# Patient Record
Sex: MALE | Race: BLACK OR AFRICAN AMERICAN | NOT HISPANIC OR LATINO | Employment: OTHER | URBAN - NONMETROPOLITAN AREA
[De-identification: names, ages, dates, MRNs, and addresses within clinical notes are randomized per-mention and may not be internally consistent; named-entity substitution may affect disease eponyms.]

---

## 2017-04-13 RX ORDER — AMLODIPINE BESYLATE 5 MG/1
TABLET ORAL
Qty: 90 TABLET | Refills: 2 | Status: SHIPPED | OUTPATIENT
Start: 2017-04-13 | End: 2018-01-12 | Stop reason: SDUPTHER

## 2017-04-13 RX ORDER — PRAVASTATIN SODIUM 80 MG/1
TABLET ORAL
Qty: 90 TABLET | Refills: 2 | Status: SHIPPED | OUTPATIENT
Start: 2017-04-13 | End: 2018-01-26 | Stop reason: SDUPTHER

## 2017-04-13 RX ORDER — LISINOPRIL AND HYDROCHLOROTHIAZIDE 25; 20 MG/1; MG/1
TABLET ORAL
Qty: 90 TABLET | Refills: 2 | Status: SHIPPED | OUTPATIENT
Start: 2017-04-13 | End: 2018-01-12 | Stop reason: SDUPTHER

## 2017-04-21 ENCOUNTER — LAB (OUTPATIENT)
Dept: LAB | Facility: OTHER | Age: 62
End: 2017-04-21

## 2017-04-21 DIAGNOSIS — I10 ESSENTIAL HYPERTENSION: ICD-10-CM

## 2017-04-21 DIAGNOSIS — E78.5 HYPERLIPIDEMIA, UNSPECIFIED HYPERLIPIDEMIA TYPE: ICD-10-CM

## 2017-04-21 DIAGNOSIS — E11.9 TYPE 2 DIABETES MELLITUS WITHOUT COMPLICATION, WITHOUT LONG-TERM CURRENT USE OF INSULIN (HCC): ICD-10-CM

## 2017-04-21 LAB
ALBUMIN SERPL-MCNC: 4.4 G/DL (ref 3.2–5.5)
ALBUMIN/GLOB SERPL: 1.3 G/DL (ref 1–3)
ALP SERPL-CCNC: 69 U/L (ref 15–121)
ALT SERPL W P-5'-P-CCNC: 56 U/L (ref 10–60)
ANION GAP SERPL CALCULATED.3IONS-SCNC: 9 MMOL/L (ref 5–15)
AST SERPL-CCNC: 45 U/L (ref 10–60)
BILIRUB SERPL-MCNC: 1.1 MG/DL (ref 0.2–1)
BUN BLD-MCNC: 15 MG/DL (ref 8–25)
BUN/CREAT SERPL: 16.7 (ref 7–25)
CALCIUM SPEC-SCNC: 9.7 MG/DL (ref 8.4–10.8)
CHLORIDE SERPL-SCNC: 104 MMOL/L (ref 100–112)
CHOLEST SERPL-MCNC: 148 MG/DL (ref 150–200)
CO2 SERPL-SCNC: 27 MMOL/L (ref 20–32)
CREAT BLD-MCNC: 0.9 MG/DL (ref 0.4–1.3)
DEPRECATED RDW RBC AUTO: 39.3 FL (ref 35.1–43.9)
ERYTHROCYTE [DISTWIDTH] IN BLOOD BY AUTOMATED COUNT: 16.5 % (ref 11.5–14.5)
GFR SERPL CREATININE-BSD FRML MDRD: 103 ML/MIN/1.73 (ref 49–113)
GLOBULIN UR ELPH-MCNC: 3.5 GM/DL (ref 2.5–4.6)
GLUCOSE BLD-MCNC: 120 MG/DL (ref 70–100)
HBA1C MFR BLD: 6.67 % (ref 4–5.6)
HCT VFR BLD AUTO: 38.4 % (ref 39–49)
HDLC SERPL-MCNC: 34 MG/DL (ref 35–100)
HGB BLD-MCNC: 12.8 G/DL (ref 13.7–17.3)
LARGE PLATELETS: NORMAL
LDLC SERPL CALC-MCNC: 96 MG/DL
LDLC/HDLC SERPL: 2.81 {RATIO}
LYMPHOCYTES # BLD MANUAL: 3.57 10*3/MM3 (ref 0.6–4.2)
LYMPHOCYTES NFR BLD MANUAL: 46 % (ref 10–50)
LYMPHOCYTES NFR BLD MANUAL: 8 % (ref 0–12)
MCH RBC QN AUTO: 22.3 PG (ref 26.5–34)
MCHC RBC AUTO-ENTMCNC: 33.3 G/DL (ref 31.5–36.3)
MCV RBC AUTO: 66.9 FL (ref 80–98)
MICROCYTES BLD QL: NORMAL
MONOCYTES # BLD AUTO: 0.62 10*3/MM3 (ref 0–0.9)
NEUTROPHILS # BLD AUTO: 3.57 10*3/MM3 (ref 2–8.6)
NEUTROPHILS NFR BLD MANUAL: 46 % (ref 37–80)
PLATELET # BLD AUTO: 124 10*3/MM3 (ref 150–450)
PMV BLD AUTO: 0 FL (ref 8–12)
POTASSIUM BLD-SCNC: 3.7 MMOL/L (ref 3.4–5.4)
PROT SERPL-MCNC: 7.9 G/DL (ref 6.7–8.2)
PSA SERPL-MCNC: 0.68 NG/ML (ref 0–4)
RBC # BLD AUTO: 5.74 10*6/MM3 (ref 4.37–5.74)
SCAN SLIDE: NORMAL
SMALL PLATELETS BLD QL SMEAR: NORMAL
SODIUM BLD-SCNC: 140 MMOL/L (ref 134–146)
TARGETS BLD QL SMEAR: NORMAL
TRIGL SERPL-MCNC: 92 MG/DL (ref 35–160)
TSH SERPL DL<=0.05 MIU/L-ACNC: 1.45 MIU/ML (ref 0.46–4.68)
VLDLC SERPL-MCNC: 18.4 MG/DL
WBC MORPH BLD: NORMAL
WBC NRBC COR # BLD: 7.77 10*3/MM3 (ref 3.2–9.8)

## 2017-04-21 PROCEDURE — G0103 PSA SCREENING: HCPCS | Performed by: FAMILY MEDICINE

## 2017-04-21 PROCEDURE — 36415 COLL VENOUS BLD VENIPUNCTURE: CPT | Performed by: FAMILY MEDICINE

## 2017-04-21 PROCEDURE — 80053 COMPREHEN METABOLIC PANEL: CPT | Performed by: FAMILY MEDICINE

## 2017-04-21 PROCEDURE — 85025 COMPLETE CBC W/AUTO DIFF WBC: CPT | Performed by: FAMILY MEDICINE

## 2017-04-21 PROCEDURE — 83036 HEMOGLOBIN GLYCOSYLATED A1C: CPT | Performed by: FAMILY MEDICINE

## 2017-04-21 PROCEDURE — 80061 LIPID PANEL: CPT | Performed by: FAMILY MEDICINE

## 2017-04-21 PROCEDURE — 84443 ASSAY THYROID STIM HORMONE: CPT | Performed by: FAMILY MEDICINE

## 2017-04-28 ENCOUNTER — OFFICE VISIT (OUTPATIENT)
Dept: FAMILY MEDICINE CLINIC | Facility: CLINIC | Age: 62
End: 2017-04-28

## 2017-04-28 VITALS
BODY MASS INDEX: 31.58 KG/M2 | TEMPERATURE: 96.6 F | SYSTOLIC BLOOD PRESSURE: 132 MMHG | HEIGHT: 71 IN | WEIGHT: 225.6 LBS | HEART RATE: 64 BPM | OXYGEN SATURATION: 96 % | DIASTOLIC BLOOD PRESSURE: 90 MMHG

## 2017-04-28 DIAGNOSIS — I10 ESSENTIAL HYPERTENSION: Primary | ICD-10-CM

## 2017-04-28 DIAGNOSIS — B18.2 CHRONIC HEPATITIS C WITHOUT HEPATIC COMA (HCC): ICD-10-CM

## 2017-04-28 DIAGNOSIS — E78.01 FAMILIAL HYPERCHOLESTEROLEMIA: ICD-10-CM

## 2017-04-28 DIAGNOSIS — E11.9 TYPE 2 DIABETES MELLITUS WITHOUT COMPLICATION, WITHOUT LONG-TERM CURRENT USE OF INSULIN (HCC): ICD-10-CM

## 2017-04-28 DIAGNOSIS — D50.9 IRON DEFICIENCY ANEMIA, UNSPECIFIED IRON DEFICIENCY ANEMIA TYPE: ICD-10-CM

## 2017-04-28 PROCEDURE — 99214 OFFICE O/P EST MOD 30 MIN: CPT | Performed by: FAMILY MEDICINE

## 2017-04-28 NOTE — PROGRESS NOTES
Subjective   Tao Cuadra is a 62 y.o. male who presents to the office for follow-up and review of labs.     History of Present Illness   Patient with history of hypertension diabetes and hyperlipidemia is in today for reevaluation and review labs.  He is doing well and voices no acute complaints at this time.  He denies any chest pain PND orthopnea.  No neurological symptoms  The following portions of the patient's history were reviewed and updated as appropriate: allergies, current medications, past family history, past medical history, past social history, past surgical history and problem list.    Review of Systems   Constitutional: Positive for fatigue.   HENT: Negative.    Eyes: Negative.    Respiratory: Negative.    Cardiovascular: Negative.    Gastrointestinal: Negative.    Endocrine: Negative.    Genitourinary: Negative.    Musculoskeletal: Positive for arthralgias.   Skin: Negative.    Allergic/Immunologic: Negative.    Neurological: Negative.    Hematological: Negative.    Psychiatric/Behavioral: Negative.    All other systems reviewed and are negative.      Objective   Physical Exam   Constitutional: He is oriented to person, place, and time. He appears well-developed and well-nourished.   HENT:   Head: Normocephalic and atraumatic.   Right Ear: External ear normal.   Left Ear: External ear normal.   Nose: Nose normal.   Mouth/Throat: Oropharynx is clear and moist.   Eyes: Conjunctivae and EOM are normal. Pupils are equal, round, and reactive to light.   Neck: Normal range of motion. Neck supple.   Cardiovascular: Normal rate, regular rhythm, normal heart sounds and intact distal pulses.  Exam reveals no gallop and no friction rub.    No murmur heard.  Pulmonary/Chest: Effort normal and breath sounds normal. He has no wheezes. He has no rales.   Abdominal: Soft. Bowel sounds are normal. He exhibits no mass. There is no tenderness. There is no rebound and no guarding.   Musculoskeletal: Normal range of  motion.    Tao had a diabetic foot exam performed today.   During the foot exam he had a monofilament test performed.    Neurological Sensory Findings -  Unaltered sharp/dull right ankle/foot discrimination and unaltered sharp/dull left ankle/foot discrimination.    Vascular Status -  His exam exhibits right foot vasculature normal. His exam exhibits left foot vasculature normal.   Skin Integrity  -  His right foot skin is intact.     Tao 's left foot skin is intact. .  Neurological: He is alert and oriented to person, place, and time. He has normal reflexes. No cranial nerve deficit. He exhibits normal muscle tone.   Skin: Skin is warm and dry. No rash noted.   Psychiatric: He has a normal mood and affect. His behavior is normal. Judgment and thought content normal.   Nursing note and vitals reviewed.      Assessment/Plan   Tao was seen today for hypertension and diabetes.    Diagnoses and all orders for this visit:    Essential hypertension    Familial hypercholesterolemia    Type 2 diabetes mellitus without complication, without long-term current use of insulin  -     Comprehensive Metabolic Panel; Future  -     Hemoglobin A1c; Future  -     LDL Cholesterol, Direct; Future  -     MicroAlbumin, Urine, Random; Future    Iron deficiency anemia, unspecified iron deficiency anemia type    Chronic hepatitis C without hepatic coma    continue current therapies     Labs are reviewed with patient.    PHQ-9 Depression Screening 4/28/2017   Little interest or pleasure in doing things 1   Feeling down, depressed, or hopeless 0   Trouble falling or staying asleep, or sleeping too much 1   Feeling tired or having little energy 1   Poor appetite or overeating 0   Feeling bad about yourself - or that you are a failure or have let yourself or your family down 0   Trouble concentrating on things, such as reading the newspaper or watching television 0   Moving or speaking so slowly that other people could have noticed.  Or the opposite - being so fidgety or restless that you have been moving around a lot more than usual 0   Thoughts that you would be better off dead, or of hurting yourself in some way 0   PHQ-9 Total Score 3   If you checked off any problems, how difficult have these problems made it for you to do your work, take care of things at home, or get along with other people? Not difficult at all         Lab on 04/21/2017   Component Date Value Ref Range Status   • Glucose 04/21/2017 120* 70 - 100 mg/dL Final   • BUN 04/21/2017 15  8 - 25 mg/dL Final   • Creatinine 04/21/2017 0.90  0.40 - 1.30 mg/dL Final   • Sodium 04/21/2017 140  134 - 146 mmol/L Final   • Potassium 04/21/2017 3.7  3.4 - 5.4 mmol/L Final   • Chloride 04/21/2017 104  100 - 112 mmol/L Final   • CO2 04/21/2017 27.0  20.0 - 32.0 mmol/L Final   • Calcium 04/21/2017 9.7  8.4 - 10.8 mg/dL Final   • Total Protein 04/21/2017 7.9  6.7 - 8.2 g/dL Final   • Albumin 04/21/2017 4.40  3.20 - 5.50 g/dL Final   • ALT (SGPT) 04/21/2017 56  10 - 60 U/L Final   • AST (SGOT) 04/21/2017 45  10 - 60 U/L Final   • Alkaline Phosphatase 04/21/2017 69  15 - 121 U/L Final   • Total Bilirubin 04/21/2017 1.1* 0.2 - 1.0 mg/dL Final   • eGFR   Amer 04/21/2017 103  49 - 113 mL/min/1.73 Final   • Globulin 04/21/2017 3.5  2.5 - 4.6 gm/dL Final   • A/G Ratio 04/21/2017 1.3  1.0 - 3.0 g/dL Final   • BUN/Creatinine Ratio 04/21/2017 16.7  7.0 - 25.0 Final   • Anion Gap 04/21/2017 9.0  5.0 - 15.0 mmol/L Final   • Hemoglobin A1C 04/21/2017 6.67* 4 - 5.6 % Final   • Total Cholesterol 04/21/2017 148* 150 - 200 mg/dL Final   • Triglycerides 04/21/2017 92  35 - 160 mg/dL Final   • HDL Cholesterol 04/21/2017 34* 35 - 100 mg/dL Final   • LDL Cholesterol  04/21/2017 96  mg/dL Final   • VLDL Cholesterol 04/21/2017 18.4  mg/dL Final   • LDL/HDL Ratio 04/21/2017 2.81   Final   • TSH 04/21/2017 1.450  0.460 - 4.680 mIU/mL Final   • PSA 04/21/2017 0.679  0.000 - 4.000 ng/mL Final   • WBC 04/21/2017  7.77  3.20 - 9.80 10*3/mm3 Final   • RBC 04/21/2017 5.74  4.37 - 5.74 10*6/mm3 Final   • Hemoglobin 04/21/2017 12.8* 13.7 - 17.3 g/dL Final   • Hematocrit 04/21/2017 38.4* 39.0 - 49.0 % Final   • MCV 04/21/2017 66.9* 80.0 - 98.0 fL Final   • MCH 04/21/2017 22.3* 26.5 - 34.0 pg Final   • MCHC 04/21/2017 33.3  31.5 - 36.3 g/dL Final   • RDW 04/21/2017 16.5* 11.5 - 14.5 % Final   • RDW-SD 04/21/2017 39.3  35.1 - 43.9 fl Final   • MPV 04/21/2017 0.0* 8.0 - 12.0 fL Final   • Platelets 04/21/2017 124* 150 - 450 10*3/mm3 Final   • Scan Slide 04/21/2017    Final    See Manual Differential Results   • Neutrophil % 04/21/2017 46.0  37.0 - 80.0 % Final   • Lymphocyte % 04/21/2017 46.0  10.0 - 50.0 % Final   • Monocyte % 04/21/2017 8.0  0.0 - 12.0 % Final   • Neutrophils Absolute 04/21/2017 3.57  2.00 - 8.60 10*3/mm3 Final   • Lymphocytes Absolute 04/21/2017 3.57  0.60 - 4.20 10*3/mm3 Final   • Monocytes Absolute 04/21/2017 0.62  0.00 - 0.90 10*3/mm3 Final   • Microcytes 04/21/2017 Slight/1+  None Seen Final   • Target Cells 04/21/2017 Slight/1+  None Seen Final   • WBC Morphology 04/21/2017 Normal  Normal Final   • Platelet Estimate 04/21/2017 Decreased  Normal Final   • Large Platelets 04/21/2017 Slight/1+  None Seen Final   ]

## 2017-06-06 RX ORDER — METFORMIN HYDROCHLORIDE 500 MG/1
TABLET, EXTENDED RELEASE ORAL
Qty: 360 TABLET | Refills: 5 | Status: SHIPPED | OUTPATIENT
Start: 2017-06-06 | End: 2018-09-05 | Stop reason: SDUPTHER

## 2017-06-22 RX ORDER — IRON POLYSACCHARIDE COMPLEX 150 MG
CAPSULE ORAL
Qty: 30 CAPSULE | Refills: 0 | Status: SHIPPED | OUTPATIENT
Start: 2017-06-22 | End: 2017-06-23 | Stop reason: ALTCHOICE

## 2017-06-23 RX ORDER — IRON POLYSACCHARIDE COMPLEX 150 MG
CAPSULE ORAL
Qty: 30 CAPSULE | Refills: 0 | Status: SHIPPED | OUTPATIENT
Start: 2017-06-23 | End: 2018-02-20 | Stop reason: SDUPTHER

## 2017-10-23 ENCOUNTER — LAB (OUTPATIENT)
Dept: LAB | Facility: OTHER | Age: 62
End: 2017-10-23

## 2017-10-23 DIAGNOSIS — E11.9 TYPE 2 DIABETES MELLITUS WITHOUT COMPLICATION, WITHOUT LONG-TERM CURRENT USE OF INSULIN (HCC): ICD-10-CM

## 2017-10-23 LAB
ALBUMIN SERPL-MCNC: 4.4 G/DL (ref 3.2–5.5)
ALBUMIN UR-MCNC: <0.6 MG/L
ALBUMIN/GLOB SERPL: 1.3 G/DL (ref 1–3)
ALP SERPL-CCNC: 53 U/L (ref 15–121)
ALT SERPL W P-5'-P-CCNC: 55 U/L (ref 10–60)
ANION GAP SERPL CALCULATED.3IONS-SCNC: 9 MMOL/L (ref 5–15)
ARTICHOKE IGE QN: 93 MG/DL (ref 0–129)
AST SERPL-CCNC: 49 U/L (ref 10–60)
BILIRUB SERPL-MCNC: 1.2 MG/DL (ref 0.2–1)
BUN BLD-MCNC: 17 MG/DL (ref 8–25)
BUN/CREAT SERPL: 17 (ref 7–25)
CALCIUM SPEC-SCNC: 9.6 MG/DL (ref 8.4–10.8)
CHLORIDE SERPL-SCNC: 105 MMOL/L (ref 100–112)
CO2 SERPL-SCNC: 24 MMOL/L (ref 20–32)
CREAT BLD-MCNC: 1 MG/DL (ref 0.4–1.3)
GFR SERPL CREATININE-BSD FRML MDRD: 91 ML/MIN/1.73 (ref 49–113)
GLOBULIN UR ELPH-MCNC: 3.4 GM/DL (ref 2.5–4.6)
GLUCOSE BLD-MCNC: 108 MG/DL (ref 70–100)
HBA1C MFR BLD: 6.3 % (ref 4–5.6)
POTASSIUM BLD-SCNC: 3.8 MMOL/L (ref 3.4–5.4)
PROT SERPL-MCNC: 7.8 G/DL (ref 6.7–8.2)
SODIUM BLD-SCNC: 138 MMOL/L (ref 134–146)

## 2017-10-23 PROCEDURE — 83036 HEMOGLOBIN GLYCOSYLATED A1C: CPT | Performed by: FAMILY MEDICINE

## 2017-10-23 PROCEDURE — 36415 COLL VENOUS BLD VENIPUNCTURE: CPT | Performed by: FAMILY MEDICINE

## 2017-10-23 PROCEDURE — 82043 UR ALBUMIN QUANTITATIVE: CPT | Performed by: FAMILY MEDICINE

## 2017-10-23 PROCEDURE — 83721 ASSAY OF BLOOD LIPOPROTEIN: CPT | Performed by: FAMILY MEDICINE

## 2017-10-23 PROCEDURE — 80053 COMPREHEN METABOLIC PANEL: CPT | Performed by: FAMILY MEDICINE

## 2017-10-30 ENCOUNTER — OFFICE VISIT (OUTPATIENT)
Dept: FAMILY MEDICINE CLINIC | Facility: CLINIC | Age: 62
End: 2017-10-30

## 2017-10-30 VITALS
BODY MASS INDEX: 30.42 KG/M2 | WEIGHT: 224.6 LBS | RESPIRATION RATE: 16 BRPM | HEIGHT: 72 IN | OXYGEN SATURATION: 95 % | TEMPERATURE: 96.2 F | DIASTOLIC BLOOD PRESSURE: 82 MMHG | SYSTOLIC BLOOD PRESSURE: 126 MMHG | HEART RATE: 84 BPM

## 2017-10-30 DIAGNOSIS — E11.9 TYPE 2 DIABETES MELLITUS WITHOUT COMPLICATION, WITHOUT LONG-TERM CURRENT USE OF INSULIN (HCC): Primary | ICD-10-CM

## 2017-10-30 DIAGNOSIS — B18.2 CHRONIC HEPATITIS C WITHOUT HEPATIC COMA (HCC): ICD-10-CM

## 2017-10-30 DIAGNOSIS — D50.9 IRON DEFICIENCY ANEMIA, UNSPECIFIED IRON DEFICIENCY ANEMIA TYPE: ICD-10-CM

## 2017-10-30 DIAGNOSIS — I10 ESSENTIAL HYPERTENSION: ICD-10-CM

## 2017-10-30 DIAGNOSIS — E78.01 FAMILIAL HYPERCHOLESTEROLEMIA: ICD-10-CM

## 2017-10-30 PROCEDURE — 99214 OFFICE O/P EST MOD 30 MIN: CPT | Performed by: FAMILY MEDICINE

## 2017-10-30 NOTE — PROGRESS NOTES
Subjective   Tao Cuadra is a 62 y.o. male who presents to the office for follow-up and review of labs.     History of Present Illness   Patient with multiple medical problems including hypertension, hyperlipidemia, and type 2 diabetes mellitus is in today for reevaluation and review labs.  Patient is doing well at this time and denies any acute complaints.  He's had no chest pain, PND, orthopnea.  No neurological symptoms.  He has not been as compliant with his diet as he should.  The following portions of the patient's history were reviewed and updated as appropriate: allergies, current medications, past family history, past medical history, past social history, past surgical history and problem list.    Review of Systems   Constitutional: Positive for fatigue.   HENT: Negative.    Eyes: Negative.    Respiratory: Negative.    Cardiovascular: Negative.    Gastrointestinal: Negative.    Endocrine: Negative.    Genitourinary: Negative.    Musculoskeletal: Positive for arthralgias and back pain.   Skin: Negative.    Allergic/Immunologic: Negative.    Neurological: Negative.    Hematological: Negative.    Psychiatric/Behavioral: Negative.    All other systems reviewed and are negative.      Objective   Physical Exam   Constitutional: He is oriented to person, place, and time. He appears well-developed and well-nourished.   HENT:   Head: Normocephalic and atraumatic.   Right Ear: External ear normal.   Left Ear: External ear normal.   Nose: Nose normal.   Mouth/Throat: Oropharynx is clear and moist.   Eyes: Conjunctivae and EOM are normal. Pupils are equal, round, and reactive to light.   Neck: Normal range of motion. Neck supple.   Cardiovascular: Normal rate, regular rhythm, normal heart sounds and intact distal pulses.  Exam reveals no gallop and no friction rub.    No murmur heard.  Pulmonary/Chest: Effort normal and breath sounds normal. He has no wheezes. He has no rales.   Abdominal: Soft. Bowel sounds are  normal. He exhibits no mass. There is no tenderness. There is no rebound and no guarding.   Musculoskeletal: Normal range of motion.    Tao had a diabetic foot exam performed today.   During the foot exam he had a monofilament test performed.    Neurological Sensory Findings -  Unaltered sharp/dull right ankle/foot discrimination and unaltered sharp/dull left ankle/foot discrimination.    Vascular Status -  His exam exhibits right foot vasculature normal. His exam exhibits left foot vasculature normal.   Skin Integrity  -  His right foot skin is intact.     Tao 's left foot skin is intact. .  Neurological: He is alert and oriented to person, place, and time. He has normal reflexes. No cranial nerve deficit. He exhibits normal muscle tone.   Skin: Skin is warm and dry. No rash noted.   Psychiatric: He has a normal mood and affect. His behavior is normal. Judgment and thought content normal.   Nursing note and vitals reviewed.      Assessment/Plan   Tao was seen today for diabetes, hyperlipidemia, hypertension and pulled muscle.    Diagnoses and all orders for this visit:    Type 2 diabetes mellitus without complication, without long-term current use of insulin  -     Ambulatory Referral for Diabetic Eye Exam-Ophthalmology  -     Hemoglobin A1c; Future  -     Lipid Panel; Future    Essential hypertension  -     CBC & Differential; Future  -     Comprehensive Metabolic Panel; Future  -     Lipid Panel; Future  -     TSH; Future  -     PSA Screen; Future    Familial hypercholesterolemia    Chronic hepatitis C without hepatic coma    Iron deficiency anemia, unspecified iron deficiency anemia type  -     CBC & Differential; Future         Labs are reviewed with patient.    PHQ-2/PHQ-9 Depression Screening 4/28/2017   Little interest or pleasure in doing things 1   Feeling down, depressed, or hopeless 0   Trouble falling or staying asleep, or sleeping too much 1   Feeling tired or having little energy 1   Poor  appetite or overeating 0   Feeling bad about yourself - or that you are a failure or have let yourself or your family down 0   Trouble concentrating on things, such as reading the newspaper or watching television 0   Moving or speaking so slowly that other people could have noticed. Or the opposite - being so fidgety or restless that you have been moving around a lot more than usual 0   Thoughts that you would be better off dead, or of hurting yourself in some way 0   Total Score 3   If you checked off any problems, how difficult have these problems made it for you to do your work, take care of things at home, or get along with other people? Not difficult at all         Lab on 10/23/2017   Component Date Value Ref Range Status   • Microalbumin, Urine 10/23/2017 <0.6  mg/L Final   • Glucose 10/23/2017 108* 70 - 100 mg/dL Final   • BUN 10/23/2017 17  8 - 25 mg/dL Final   • Creatinine 10/23/2017 1.00  0.40 - 1.30 mg/dL Final   • Sodium 10/23/2017 138  134 - 146 mmol/L Final   • Potassium 10/23/2017 3.8  3.4 - 5.4 mmol/L Final   • Chloride 10/23/2017 105  100 - 112 mmol/L Final   • CO2 10/23/2017 24.0  20.0 - 32.0 mmol/L Final   • Calcium 10/23/2017 9.6  8.4 - 10.8 mg/dL Final   • Total Protein 10/23/2017 7.8  6.7 - 8.2 g/dL Final   • Albumin 10/23/2017 4.40  3.20 - 5.50 g/dL Final   • ALT (SGPT) 10/23/2017 55  10 - 60 U/L Final   • AST (SGOT) 10/23/2017 49  10 - 60 U/L Final   • Alkaline Phosphatase 10/23/2017 53  15 - 121 U/L Final   • Total Bilirubin 10/23/2017 1.2* 0.2 - 1.0 mg/dL Final   • eGFR   Amer 10/23/2017 91  49 - 113 mL/min/1.73 Final   • Globulin 10/23/2017 3.4  2.5 - 4.6 gm/dL Final   • A/G Ratio 10/23/2017 1.3  1.0 - 3.0 g/dL Final   • BUN/Creatinine Ratio 10/23/2017 17.0  7.0 - 25.0 Final   • Anion Gap 10/23/2017 9.0  5.0 - 15.0 mmol/L Final   • Hemoglobin A1C 10/23/2017 6.3* 4 - 5.6 % Final   • LDL Cholesterol  10/23/2017 93  0 - 129 mg/dL Final   ]

## 2018-01-15 RX ORDER — LISINOPRIL AND HYDROCHLOROTHIAZIDE 25; 20 MG/1; MG/1
TABLET ORAL
Qty: 90 TABLET | Refills: 2 | Status: SHIPPED | OUTPATIENT
Start: 2018-01-15 | End: 2018-10-15 | Stop reason: SDUPTHER

## 2018-01-15 RX ORDER — AMLODIPINE BESYLATE 5 MG/1
TABLET ORAL
Qty: 90 TABLET | Refills: 2 | Status: SHIPPED | OUTPATIENT
Start: 2018-01-15 | End: 2018-10-15 | Stop reason: SDUPTHER

## 2018-01-26 RX ORDER — PRAVASTATIN SODIUM 80 MG/1
TABLET ORAL
Qty: 90 TABLET | Refills: 2 | Status: SHIPPED | OUTPATIENT
Start: 2018-01-26 | End: 2018-11-16 | Stop reason: SDUPTHER

## 2018-02-21 RX ORDER — IRON POLYSACCHARIDE COMPLEX 150 MG
CAPSULE ORAL
Qty: 30 CAPSULE | Refills: 5 | Status: SHIPPED | OUTPATIENT
Start: 2018-02-21 | End: 2018-08-08 | Stop reason: SDUPTHER

## 2018-04-23 ENCOUNTER — LAB (OUTPATIENT)
Dept: LAB | Facility: OTHER | Age: 63
End: 2018-04-23

## 2018-04-23 DIAGNOSIS — E11.9 TYPE 2 DIABETES MELLITUS WITHOUT COMPLICATION, WITHOUT LONG-TERM CURRENT USE OF INSULIN (HCC): ICD-10-CM

## 2018-04-23 DIAGNOSIS — I10 ESSENTIAL HYPERTENSION: ICD-10-CM

## 2018-04-23 DIAGNOSIS — D50.9 IRON DEFICIENCY ANEMIA, UNSPECIFIED IRON DEFICIENCY ANEMIA TYPE: ICD-10-CM

## 2018-04-23 LAB
ALBUMIN SERPL-MCNC: 4.3 G/DL (ref 3.2–5.5)
ALBUMIN/GLOB SERPL: 1.2 G/DL (ref 1–3)
ALP SERPL-CCNC: 55 U/L (ref 15–121)
ALT SERPL W P-5'-P-CCNC: 32 U/L (ref 10–60)
ANION GAP SERPL CALCULATED.3IONS-SCNC: 8 MMOL/L (ref 5–15)
ANISOCYTOSIS BLD QL: ABNORMAL
AST SERPL-CCNC: 30 U/L (ref 10–60)
BILIRUB SERPL-MCNC: 1 MG/DL (ref 0.2–1)
BUN BLD-MCNC: 18 MG/DL (ref 8–25)
BUN/CREAT SERPL: 18 (ref 7–25)
CALCIUM SPEC-SCNC: 9.4 MG/DL (ref 8.4–10.8)
CHLORIDE SERPL-SCNC: 104 MMOL/L (ref 100–112)
CHOLEST SERPL-MCNC: 137 MG/DL (ref 150–200)
CO2 SERPL-SCNC: 25 MMOL/L (ref 20–32)
CREAT BLD-MCNC: 1 MG/DL (ref 0.4–1.3)
DEPRECATED RDW RBC AUTO: 39.5 FL (ref 35.1–43.9)
EOSINOPHIL # BLD MANUAL: 0.19 10*3/MM3 (ref 0–0.7)
EOSINOPHIL NFR BLD MANUAL: 2 % (ref 0–7)
ERYTHROCYTE [DISTWIDTH] IN BLOOD BY AUTOMATED COUNT: 16.6 % (ref 11.5–14.5)
GFR SERPL CREATININE-BSD FRML MDRD: 91 ML/MIN/1.73 (ref 49–113)
GLOBULIN UR ELPH-MCNC: 3.7 GM/DL (ref 2.5–4.6)
GLUCOSE BLD-MCNC: 113 MG/DL (ref 70–100)
HBA1C MFR BLD: 6.6 % (ref 4–5.6)
HCT VFR BLD AUTO: 38.9 % (ref 39–49)
HDLC SERPL-MCNC: 32 MG/DL (ref 35–100)
HGB BLD-MCNC: 12.5 G/DL (ref 13.7–17.3)
HYPOCHROMIA BLD QL: ABNORMAL
LARGE PLATELETS: ABNORMAL
LDLC SERPL CALC-MCNC: 88 MG/DL
LDLC/HDLC SERPL: 2.76 {RATIO}
LYMPHOCYTES # BLD MANUAL: 4.22 10*3/MM3 (ref 0.6–4.2)
LYMPHOCYTES NFR BLD MANUAL: 10 % (ref 0–12)
LYMPHOCYTES NFR BLD MANUAL: 44 % (ref 10–50)
MCH RBC QN AUTO: 21.9 PG (ref 26.5–34)
MCHC RBC AUTO-ENTMCNC: 32.1 G/DL (ref 31.5–36.3)
MCV RBC AUTO: 68.1 FL (ref 80–98)
MICROCYTES BLD QL: ABNORMAL
MONOCYTES # BLD AUTO: 0.96 10*3/MM3 (ref 0–0.9)
NEUTROPHILS # BLD AUTO: 4.22 10*3/MM3 (ref 2–8.6)
NEUTROPHILS NFR BLD MANUAL: 44 % (ref 37–80)
PLATELET # BLD AUTO: 131 10*3/MM3 (ref 150–450)
PMV BLD AUTO: 0 FL (ref 8–12)
POTASSIUM BLD-SCNC: 3.6 MMOL/L (ref 3.4–5.4)
PROT SERPL-MCNC: 8 G/DL (ref 6.7–8.2)
PSA SERPL-MCNC: 0.79 NG/ML (ref 0–4)
RBC # BLD AUTO: 5.71 10*6/MM3 (ref 4.37–5.74)
SCAN SLIDE: NORMAL
SODIUM BLD-SCNC: 137 MMOL/L (ref 134–146)
TRIGL SERPL-MCNC: 83 MG/DL (ref 35–160)
TSH SERPL DL<=0.05 MIU/L-ACNC: 1.32 MIU/ML (ref 0.46–4.68)
VLDLC SERPL-MCNC: 16.6 MG/DL
WBC MORPH BLD: NORMAL
WBC NRBC COR # BLD: 9.58 10*3/MM3 (ref 3.2–9.8)

## 2018-04-23 PROCEDURE — 83036 HEMOGLOBIN GLYCOSYLATED A1C: CPT | Performed by: FAMILY MEDICINE

## 2018-04-23 PROCEDURE — G0103 PSA SCREENING: HCPCS | Performed by: FAMILY MEDICINE

## 2018-04-23 PROCEDURE — 85025 COMPLETE CBC W/AUTO DIFF WBC: CPT | Performed by: FAMILY MEDICINE

## 2018-04-23 PROCEDURE — 84443 ASSAY THYROID STIM HORMONE: CPT | Performed by: FAMILY MEDICINE

## 2018-04-23 PROCEDURE — 36415 COLL VENOUS BLD VENIPUNCTURE: CPT | Performed by: FAMILY MEDICINE

## 2018-04-23 PROCEDURE — 80053 COMPREHEN METABOLIC PANEL: CPT | Performed by: FAMILY MEDICINE

## 2018-04-23 PROCEDURE — 80061 LIPID PANEL: CPT | Performed by: FAMILY MEDICINE

## 2018-04-30 ENCOUNTER — OFFICE VISIT (OUTPATIENT)
Dept: FAMILY MEDICINE CLINIC | Facility: CLINIC | Age: 63
End: 2018-04-30

## 2018-04-30 VITALS
HEART RATE: 64 BPM | DIASTOLIC BLOOD PRESSURE: 86 MMHG | BODY MASS INDEX: 31.15 KG/M2 | SYSTOLIC BLOOD PRESSURE: 128 MMHG | TEMPERATURE: 97.7 F | HEIGHT: 72 IN | WEIGHT: 230 LBS | OXYGEN SATURATION: 97 % | RESPIRATION RATE: 16 BRPM

## 2018-04-30 DIAGNOSIS — E11.9 TYPE 2 DIABETES MELLITUS WITHOUT COMPLICATION, WITHOUT LONG-TERM CURRENT USE OF INSULIN (HCC): ICD-10-CM

## 2018-04-30 DIAGNOSIS — B18.2 CHRONIC HEPATITIS C WITHOUT HEPATIC COMA (HCC): ICD-10-CM

## 2018-04-30 DIAGNOSIS — D50.9 IRON DEFICIENCY ANEMIA, UNSPECIFIED IRON DEFICIENCY ANEMIA TYPE: ICD-10-CM

## 2018-04-30 DIAGNOSIS — I10 ESSENTIAL HYPERTENSION: Primary | ICD-10-CM

## 2018-04-30 DIAGNOSIS — E78.01 FAMILIAL HYPERCHOLESTEROLEMIA: ICD-10-CM

## 2018-04-30 PROCEDURE — 99214 OFFICE O/P EST MOD 30 MIN: CPT | Performed by: FAMILY MEDICINE

## 2018-04-30 RX ORDER — SOFOSBUVIR, VELPATASVIR, AND VOXILAPREVIR 400; 100; 100 MG/1; MG/1; MG/1
TABLET, FILM COATED ORAL
COMMUNITY
Start: 2018-03-19 | End: 2018-04-30

## 2018-04-30 NOTE — PROGRESS NOTES
Subjective   Tao Cuadra is a 63 y.o. male who presents to the office for follow-up and review of labs.     History of Present Illness   Patient with history of hypertension, hyperlipidemia, type 2 diabetes mellitus is in today for reevaluation and review labs.  Patient is doing well complaining of normal aches and pains.  He denies chest pain, PND, orthopnea.  No neurological symptoms.    The following portions of the patient's history were reviewed and updated as appropriate: allergies, current medications, past family history, past medical history, past social history, past surgical history and problem list.    Review of Systems   Constitutional: Positive for fatigue.   HENT: Negative.    Eyes: Negative.    Respiratory: Negative.    Cardiovascular: Negative.    Gastrointestinal: Negative.    Endocrine: Negative.    Genitourinary: Negative.    Musculoskeletal: Positive for arthralgias and back pain.   Skin: Negative.    Allergic/Immunologic: Negative.    Neurological: Negative.    Hematological: Negative.    Psychiatric/Behavioral: Negative.    All other systems reviewed and are negative.      Objective   Physical Exam   Constitutional: He is oriented to person, place, and time. He appears well-developed and well-nourished.   HENT:   Head: Normocephalic and atraumatic.   Right Ear: External ear normal.   Left Ear: External ear normal.   Nose: Nose normal.   Mouth/Throat: Oropharynx is clear and moist.   Eyes: Conjunctivae and EOM are normal. Pupils are equal, round, and reactive to light.   Neck: Normal range of motion. Neck supple.   Cardiovascular: Normal rate, regular rhythm, normal heart sounds and intact distal pulses.  Exam reveals no gallop and no friction rub.    No murmur heard.  Pulmonary/Chest: Effort normal and breath sounds normal. He has no wheezes. He has no rales.   Abdominal: Soft. Bowel sounds are normal. He exhibits no mass. There is no tenderness. There is no rebound and no guarding.    Musculoskeletal: Normal range of motion.    Tao had a diabetic foot exam performed today.   During the foot exam he had a monofilament test performed.    Neurological Sensory Findings -  Unaltered sharp/dull right ankle/foot discrimination and unaltered sharp/dull left ankle/foot discrimination.  Vascular Status -  His right foot exhibits abnormal foot vasculature . His left foot exhibits abnormal foot vasculature .  Skin Integrity  -  His right foot skin is not intact.  His left foot skin is not intact..  Neurological: He is alert and oriented to person, place, and time. He has normal reflexes. No cranial nerve deficit. He exhibits normal muscle tone.   Skin: Skin is warm and dry. No rash noted.   Psychiatric: He has a normal mood and affect. His behavior is normal. Judgment and thought content normal.   Nursing note and vitals reviewed.      Assessment/Plan   Tao was seen today for hyperlipidemia, hypertension, diabetes and anemia.    Diagnoses and all orders for this visit:    Essential hypertension    Familial hypercholesterolemia    Type 2 diabetes mellitus without complication, without long-term current use of insulin  -     Comprehensive Metabolic Panel; Future  -     Hemoglobin A1c; Future  -     LDL Cholesterol, Direct; Future  -     MicroAlbumin, Urine, Random - Urine, Clean Catch; Future    Iron deficiency anemia, unspecified iron deficiency anemia type  -     CBC & Differential; Future    Chronic hepatitis C without hepatic coma    Continue current therapies.     Labs are reviewed with patient.    PHQ-2/PHQ-9 Depression Screening 4/30/2018   Little interest or pleasure in doing things 1   Feeling down, depressed, or hopeless 0   Trouble falling or staying asleep, or sleeping too much -   Feeling tired or having little energy -   Poor appetite or overeating -   Feeling bad about yourself - or that you are a failure or have let yourself or your family down -   Trouble concentrating on things,  such as reading the newspaper or watching television -   Moving or speaking so slowly that other people could have noticed. Or the opposite - being so fidgety or restless that you have been moving around a lot more than usual -   Thoughts that you would be better off dead, or of hurting yourself in some way -   Total Score 1   If you checked off any problems, how difficult have these problems made it for you to do your work, take care of things at home, or get along with other people? -         Lab on 04/23/2018   Component Date Value Ref Range Status   • Glucose 04/23/2018 113* 70 - 100 mg/dL Final   • BUN 04/23/2018 18  8 - 25 mg/dL Final   • Creatinine 04/23/2018 1.00  0.40 - 1.30 mg/dL Final   • Sodium 04/23/2018 137  134 - 146 mmol/L Final   • Potassium 04/23/2018 3.6  3.4 - 5.4 mmol/L Final   • Chloride 04/23/2018 104  100 - 112 mmol/L Final   • CO2 04/23/2018 25.0  20.0 - 32.0 mmol/L Final   • Calcium 04/23/2018 9.4  8.4 - 10.8 mg/dL Final   • Total Protein 04/23/2018 8.0  6.7 - 8.2 g/dL Final   • Albumin 04/23/2018 4.30  3.20 - 5.50 g/dL Final   • ALT (SGPT) 04/23/2018 32  10 - 60 U/L Final   • AST (SGOT) 04/23/2018 30  10 - 60 U/L Final   • Alkaline Phosphatase 04/23/2018 55  15 - 121 U/L Final   • Total Bilirubin 04/23/2018 1.0  0.2 - 1.0 mg/dL Final   • eGFR   Amer 04/23/2018 91  49 - 113 mL/min/1.73 Final   • Globulin 04/23/2018 3.7  2.5 - 4.6 gm/dL Final   • A/G Ratio 04/23/2018 1.2  1.0 - 3.0 g/dL Final   • BUN/Creatinine Ratio 04/23/2018 18.0  7.0 - 25.0 Final   • Anion Gap 04/23/2018 8.0  5.0 - 15.0 mmol/L Final   • Hemoglobin A1C 04/23/2018 6.6* 4 - 5.6 % Final   • Total Cholesterol 04/23/2018 137* 150 - 200 mg/dL Final   • Triglycerides 04/23/2018 83  35 - 160 mg/dL Final   • HDL Cholesterol 04/23/2018 32* 35 - 100 mg/dL Final   • LDL Cholesterol  04/23/2018 88  mg/dL Final   • VLDL Cholesterol 04/23/2018 16.6  mg/dL Final   • LDL/HDL Ratio 04/23/2018 2.76   Final   • TSH 04/23/2018 1.320   0.460 - 4.680 mIU/mL Final   • PSA 04/23/2018 0.791  0.000 - 4.000 ng/mL Final   • WBC 04/23/2018 9.58  3.20 - 9.80 10*3/mm3 Final   • RBC 04/23/2018 5.71  4.37 - 5.74 10*6/mm3 Final   • Hemoglobin 04/23/2018 12.5* 13.7 - 17.3 g/dL Final   • Hematocrit 04/23/2018 38.9* 39.0 - 49.0 % Final   • MCV 04/23/2018 68.1* 80.0 - 98.0 fL Final   • MCH 04/23/2018 21.9* 26.5 - 34.0 pg Final   • MCHC 04/23/2018 32.1  31.5 - 36.3 g/dL Final   • RDW 04/23/2018 16.6* 11.5 - 14.5 % Final   • RDW-SD 04/23/2018 39.5  35.1 - 43.9 fl Final   • MPV 04/23/2018 0.0* 8.0 - 12.0 fL Final   • Platelets 04/23/2018 131* 150 - 450 10*3/mm3 Final   • Scan Slide 04/23/2018    Final    See Manual Differential Results   • Neutrophil % 04/23/2018 44.0  37.0 - 80.0 % Final   • Lymphocyte % 04/23/2018 44.0  10.0 - 50.0 % Final   • Monocyte % 04/23/2018 10.0  0.0 - 12.0 % Final   • Eosinophil % 04/23/2018 2.0  0.0 - 7.0 % Final   • Neutrophils Absolute 04/23/2018 4.22  2.00 - 8.60 10*3/mm3 Final   • Lymphocytes Absolute 04/23/2018 4.22* 0.60 - 4.20 10*3/mm3 Final   • Monocytes Absolute 04/23/2018 0.96* 0.00 - 0.90 10*3/mm3 Final   • Eosinophils Absolute 04/23/2018 0.19  0.00 - 0.70 10*3/mm3 Final   • Anisocytosis 04/23/2018 Slight/1+  None Seen Final   • Hypochromia 04/23/2018 Slight/1+  None Seen Final   • Microcytes 04/23/2018 Slight/1+  None Seen Final   • WBC Morphology 04/23/2018 Normal  Normal Final   • Large Platelets 04/23/2018 Slight/1+  None Seen Final   ]

## 2018-08-08 RX ORDER — IRON POLYSACCHARIDE COMPLEX 150 MG
150 CAPSULE ORAL DAILY
Qty: 30 CAPSULE | Refills: 5 | Status: SHIPPED | OUTPATIENT
Start: 2018-08-08 | End: 2018-10-05 | Stop reason: SDUPTHER

## 2018-08-20 RX ORDER — IRON POLYSACCHARIDE COMPLEX 150 MG
CAPSULE ORAL
Qty: 30 CAPSULE | Refills: 5 | Status: SHIPPED | OUTPATIENT
Start: 2018-08-20 | End: 2018-11-16 | Stop reason: SDUPTHER

## 2018-09-05 RX ORDER — METFORMIN HYDROCHLORIDE 500 MG/1
1000 TABLET, EXTENDED RELEASE ORAL 2 TIMES DAILY
Qty: 360 TABLET | Refills: 5 | Status: SHIPPED | OUTPATIENT
Start: 2018-09-05 | End: 2018-09-07 | Stop reason: SDUPTHER

## 2018-09-07 RX ORDER — METFORMIN HYDROCHLORIDE 500 MG/1
1000 TABLET, EXTENDED RELEASE ORAL 2 TIMES DAILY
Qty: 28 TABLET | Refills: 0 | Status: SHIPPED | OUTPATIENT
Start: 2018-09-07 | End: 2018-11-16 | Stop reason: SDUPTHER

## 2018-10-05 ENCOUNTER — OFFICE VISIT (OUTPATIENT)
Dept: FAMILY MEDICINE CLINIC | Facility: CLINIC | Age: 63
End: 2018-10-05

## 2018-10-05 VITALS
OXYGEN SATURATION: 98 % | WEIGHT: 224 LBS | SYSTOLIC BLOOD PRESSURE: 130 MMHG | DIASTOLIC BLOOD PRESSURE: 70 MMHG | HEIGHT: 72 IN | HEART RATE: 82 BPM | BODY MASS INDEX: 30.34 KG/M2 | TEMPERATURE: 97.6 F

## 2018-10-05 DIAGNOSIS — I10 ESSENTIAL HYPERTENSION: ICD-10-CM

## 2018-10-05 DIAGNOSIS — J01.90 ACUTE SINUSITIS, RECURRENCE NOT SPECIFIED, UNSPECIFIED LOCATION: Primary | ICD-10-CM

## 2018-10-05 PROCEDURE — 99213 OFFICE O/P EST LOW 20 MIN: CPT | Performed by: FAMILY MEDICINE

## 2018-10-05 RX ORDER — METHYLPREDNISOLONE 4 MG/1
TABLET ORAL
Qty: 21 TABLET | Refills: 0 | Status: SHIPPED | OUTPATIENT
Start: 2018-10-05 | End: 2018-10-05 | Stop reason: SDUPTHER

## 2018-10-05 RX ORDER — METHYLPREDNISOLONE 4 MG/1
TABLET ORAL
Qty: 21 TABLET | Refills: 0 | Status: SHIPPED | OUTPATIENT
Start: 2018-10-05 | End: 2018-11-16

## 2018-10-05 RX ORDER — AZITHROMYCIN 250 MG/1
TABLET, FILM COATED ORAL
Qty: 6 TABLET | Refills: 0 | Status: SHIPPED | OUTPATIENT
Start: 2018-10-05 | End: 2018-11-16

## 2018-10-05 RX ORDER — DEXTROMETHORPHAN HYDROBROMIDE AND PROMETHAZINE HYDROCHLORIDE 15; 6.25 MG/5ML; MG/5ML
5 SYRUP ORAL 4 TIMES DAILY PRN
Qty: 180 ML | Refills: 0 | Status: SHIPPED | OUTPATIENT
Start: 2018-10-05 | End: 2018-10-05 | Stop reason: SDUPTHER

## 2018-10-05 RX ORDER — DEXTROMETHORPHAN HYDROBROMIDE AND PROMETHAZINE HYDROCHLORIDE 15; 6.25 MG/5ML; MG/5ML
5 SYRUP ORAL 4 TIMES DAILY PRN
Qty: 180 ML | Refills: 0 | Status: SHIPPED | OUTPATIENT
Start: 2018-10-05 | End: 2018-10-17 | Stop reason: SDUPTHER

## 2018-10-05 RX ORDER — AZITHROMYCIN 250 MG/1
TABLET, FILM COATED ORAL
Qty: 6 TABLET | Refills: 0 | Status: SHIPPED | OUTPATIENT
Start: 2018-10-05 | End: 2018-10-05 | Stop reason: SDUPTHER

## 2018-10-05 NOTE — PROGRESS NOTES
Subjective   Tao Cuadra is a 63 y.o. male.   Chief Complaint   Patient presents with   • Sore Throat     x 3 days    • Cough   • Nasal Congestion       URI    This is a new problem. The current episode started in the past 7 days. The problem has been gradually worsening. There has been no fever. Associated symptoms include congestion, coughing, headaches, a plugged ear sensation, rhinorrhea, sinus pain and a sore throat.        The following portions of the patient's history were reviewed and updated as appropriate: allergies, current medications, past family history, past medical history, past social history, past surgical history and problem list.    Review of Systems   Constitutional: Negative.    HENT: Positive for congestion, rhinorrhea, sinus pain and sore throat.    Eyes: Negative.    Respiratory: Positive for cough.    Cardiovascular: Negative.    Gastrointestinal: Negative.    Endocrine: Negative.    Genitourinary: Negative.    Musculoskeletal: Negative.    Skin: Negative.    Allergic/Immunologic: Negative.    Neurological: Positive for headaches.   Hematological: Negative.    Psychiatric/Behavioral: Negative.    All other systems reviewed and are negative.      Objective   Physical Exam   Constitutional: He is oriented to person, place, and time. He appears well-developed and well-nourished.   HENT:   Head: Normocephalic and atraumatic.   Right Ear: External ear normal.   Left Ear: External ear normal.   Nose: Mucosal edema and rhinorrhea present.   Mouth/Throat: Posterior oropharyngeal edema and posterior oropharyngeal erythema present.   Eyes: Pupils are equal, round, and reactive to light. Conjunctivae and EOM are normal.   Neck: Normal range of motion. Neck supple.   Cardiovascular: Normal rate, regular rhythm, normal heart sounds and intact distal pulses.  Exam reveals no gallop and no friction rub.    No murmur heard.  Pulmonary/Chest: Effort normal and breath sounds normal. He has no wheezes. He  has no rales.   Abdominal: Soft. Bowel sounds are normal. He exhibits no mass. There is no tenderness. There is no rebound and no guarding.   Musculoskeletal: Normal range of motion.   Neurological: He is alert and oriented to person, place, and time. He has normal reflexes. No cranial nerve deficit. He exhibits normal muscle tone.   Skin: Skin is warm and dry. No rash noted.   Psychiatric: He has a normal mood and affect. His behavior is normal. Judgment and thought content normal.   Nursing note and vitals reviewed.      Assessment/Plan   Tao was seen today for sore throat, cough and nasal congestion.    Diagnoses and all orders for this visit:    Acute sinusitis, recurrence not specified, unspecified location    Essential hypertension    Other orders  -     Discontinue: MethylPREDNISolone (MEDROL, BHAVANA,) 4 MG tablet; Take as directed on package instructions.  -     Discontinue: azithromycin (ZITHROMAX Z-BHAVANA) 250 MG tablet; Take 2 tablets the first day, then 1 tablet daily for 4 days.  -     Discontinue: promethazine-dextromethorphan (PROMETHAZINE-DM) 6.25-15 MG/5ML syrup; Take 5 mL by mouth 4 (Four) Times a Day As Needed for Cough.  -     MethylPREDNISolone (MEDROL, BHAVANA,) 4 MG tablet; Take as directed on package instructions.  -     promethazine-dextromethorphan (PROMETHAZINE-DM) 6.25-15 MG/5ML syrup; Take 5 mL by mouth 4 (Four) Times a Day As Needed for Cough.  -     azithromycin (ZITHROMAX Z-BHAVANA) 250 MG tablet; Take 2 tablets the first day, then 1 tablet daily for 4 days.

## 2018-10-15 DIAGNOSIS — I10 ESSENTIAL HYPERTENSION: Primary | ICD-10-CM

## 2018-10-15 RX ORDER — AMLODIPINE BESYLATE 5 MG/1
5 TABLET ORAL DAILY
Qty: 90 TABLET | Refills: 2 | Status: SHIPPED | OUTPATIENT
Start: 2018-10-15 | End: 2018-10-23 | Stop reason: SDUPTHER

## 2018-10-15 RX ORDER — LISINOPRIL AND HYDROCHLOROTHIAZIDE 25; 20 MG/1; MG/1
1 TABLET ORAL DAILY
Qty: 90 TABLET | Refills: 2 | Status: SHIPPED | OUTPATIENT
Start: 2018-10-15 | End: 2018-10-22 | Stop reason: SDUPTHER

## 2018-10-17 RX ORDER — DEXTROMETHORPHAN HYDROBROMIDE AND PROMETHAZINE HYDROCHLORIDE 15; 6.25 MG/5ML; MG/5ML
5 SYRUP ORAL 4 TIMES DAILY PRN
Qty: 180 ML | Refills: 0 | Status: SHIPPED | OUTPATIENT
Start: 2018-10-17 | End: 2018-11-30

## 2018-10-22 DIAGNOSIS — I10 ESSENTIAL HYPERTENSION: ICD-10-CM

## 2018-10-22 RX ORDER — LISINOPRIL AND HYDROCHLOROTHIAZIDE 25; 20 MG/1; MG/1
1 TABLET ORAL DAILY
Qty: 30 TABLET | Refills: 0 | Status: SHIPPED | OUTPATIENT
Start: 2018-10-22 | End: 2018-11-16 | Stop reason: SDUPTHER

## 2018-10-23 DIAGNOSIS — I10 ESSENTIAL HYPERTENSION: ICD-10-CM

## 2018-10-23 RX ORDER — AMLODIPINE BESYLATE 5 MG/1
5 TABLET ORAL DAILY
Qty: 30 TABLET | Refills: 0 | Status: SHIPPED | OUTPATIENT
Start: 2018-10-23 | End: 2018-10-25 | Stop reason: SDUPTHER

## 2018-10-25 RX ORDER — AMLODIPINE BESYLATE 5 MG/1
5 TABLET ORAL DAILY
Qty: 30 TABLET | Refills: 0 | Status: SHIPPED | OUTPATIENT
Start: 2018-10-25 | End: 2018-11-16 | Stop reason: SDUPTHER

## 2018-11-05 ENCOUNTER — TELEPHONE (OUTPATIENT)
Dept: FAMILY MEDICINE CLINIC | Facility: CLINIC | Age: 63
End: 2018-11-05

## 2018-11-05 DIAGNOSIS — I10 ESSENTIAL HYPERTENSION: ICD-10-CM

## 2018-11-05 DIAGNOSIS — E11.9 TYPE 2 DIABETES MELLITUS WITHOUT COMPLICATION, WITHOUT LONG-TERM CURRENT USE OF INSULIN (HCC): Primary | ICD-10-CM

## 2018-11-05 NOTE — TELEPHONE ENCOUNTER
Called patient and let him know that he could establish car with Dr. Hodges. He went ahead and schedule his lab follow up appoint with our office for 11/16/18 at 10:30 am.

## 2018-11-05 NOTE — TELEPHONE ENCOUNTER
----- Message from Christiano Sutherland LPN sent at 11/2/2018  4:54 PM CDT -----  Regarding: FW: establish care  Contact: [phone]      ----- Message -----  From: Juan Hodges MD  Sent: 11/2/2018  12:47 PM  To: Christiano Sutherland LPN  Subject: RE: establish care                               Ok    ----- Message -----  From: Christiano Sutherland LPN  Sent: 11/2/2018  12:09 PM  To: Juan Hodges MD  Subject: FW: establish care                                   ----- Message -----  From: Coreen Anderson  Sent: 11/2/2018  11:55 AM  To: Christiano Sutherland LPN  Subject: establish care                                    Patient is wanting to establish care with Dr. Hodges. Wai Mcknight

## 2018-11-08 ENCOUNTER — LAB (OUTPATIENT)
Dept: LAB | Facility: OTHER | Age: 63
End: 2018-11-08

## 2018-11-08 DIAGNOSIS — E11.9 TYPE 2 DIABETES MELLITUS WITHOUT COMPLICATION, WITHOUT LONG-TERM CURRENT USE OF INSULIN (HCC): ICD-10-CM

## 2018-11-08 DIAGNOSIS — I10 ESSENTIAL HYPERTENSION: ICD-10-CM

## 2018-11-08 LAB
ALBUMIN SERPL-MCNC: 4.6 G/DL (ref 3.5–5)
ALBUMIN UR-MCNC: <0.6 MG/L
ALBUMIN/GLOB SERPL: 1.1 G/DL (ref 1.1–1.8)
ALP SERPL-CCNC: 79 U/L (ref 38–126)
ALT SERPL W P-5'-P-CCNC: 23 U/L
ANION GAP SERPL CALCULATED.3IONS-SCNC: 12 MMOL/L (ref 5–15)
ANISOCYTOSIS BLD QL: NORMAL
AST SERPL-CCNC: 26 U/L (ref 17–59)
BILIRUB SERPL-MCNC: 0.7 MG/DL (ref 0.2–1.3)
BILIRUB UR QL STRIP: NEGATIVE
BUN BLD-MCNC: 17 MG/DL (ref 9–20)
BUN/CREAT SERPL: 16.8 (ref 7–25)
CALCIUM SPEC-SCNC: 9.8 MG/DL (ref 8.4–10.2)
CHLORIDE SERPL-SCNC: 104 MMOL/L (ref 98–107)
CHOLEST SERPL-MCNC: 124 MG/DL (ref 150–200)
CLARITY UR: CLEAR
CO2 SERPL-SCNC: 27 MMOL/L (ref 22–30)
COLOR UR: YELLOW
CREAT BLD-MCNC: 1.01 MG/DL (ref 0.66–1.25)
CREAT UR-MCNC: 77.4 MG/DL
DEPRECATED RDW RBC AUTO: 39 FL (ref 35.1–43.9)
EOSINOPHIL # BLD MANUAL: 0.3 10*3/MM3 (ref 0–0.7)
EOSINOPHIL NFR BLD MANUAL: 3 % (ref 0–7)
ERYTHROCYTE [DISTWIDTH] IN BLOOD BY AUTOMATED COUNT: 17.7 % (ref 11.5–14.5)
GFR SERPL CREATININE-BSD FRML MDRD: 90 ML/MIN/1.73 (ref 49–113)
GLOBULIN UR ELPH-MCNC: 4.1 GM/DL (ref 2.3–3.5)
GLUCOSE BLD-MCNC: 112 MG/DL (ref 74–99)
GLUCOSE UR STRIP-MCNC: NEGATIVE MG/DL
HBA1C MFR BLD: 6.8 % (ref 4–5.6)
HCT VFR BLD AUTO: 36.7 % (ref 39–49)
HDLC SERPL-MCNC: 32 MG/DL (ref 40–59)
HGB BLD-MCNC: 12.1 G/DL (ref 13.7–17.3)
HGB UR QL STRIP.AUTO: NEGATIVE
HYPOCHROMIA BLD QL: NORMAL
KETONES UR QL STRIP: NEGATIVE
LARGE PLATELETS: NORMAL
LDLC SERPL CALC-MCNC: 67 MG/DL
LDLC/HDLC SERPL: 2.11 {RATIO} (ref 0–3.55)
LEUKOCYTE ESTERASE UR QL STRIP.AUTO: NEGATIVE
LYMPHOCYTES # BLD MANUAL: 4.1 10*3/MM3 (ref 0.6–4.2)
LYMPHOCYTES NFR BLD MANUAL: 41 % (ref 10–50)
LYMPHOCYTES NFR BLD MANUAL: 6 % (ref 0–12)
MCH RBC QN AUTO: 21.5 PG (ref 26.5–34)
MCHC RBC AUTO-ENTMCNC: 33 G/DL (ref 31.5–36.3)
MCV RBC AUTO: 65.1 FL (ref 80–98)
MICROALBUMIN/CREAT UR: NORMAL MG/G (ref 0–30)
MICROCYTES BLD QL: NORMAL
MONOCYTES # BLD AUTO: 0.6 10*3/MM3 (ref 0–0.9)
NEUTROPHILS # BLD AUTO: 5 10*3/MM3 (ref 2–8.6)
NEUTROPHILS NFR BLD MANUAL: 50 % (ref 37–80)
NITRITE UR QL STRIP: NEGATIVE
PH UR STRIP.AUTO: 7 [PH] (ref 5.5–8)
PLATELET # BLD AUTO: 141 10*3/MM3 (ref 150–450)
PMV BLD AUTO: 0 FL (ref 8–12)
POTASSIUM BLD-SCNC: 4 MMOL/L (ref 3.4–5)
PROT SERPL-MCNC: 8.7 G/DL (ref 6.3–8.2)
PROT UR QL STRIP: NEGATIVE
RBC # BLD AUTO: 5.64 10*6/MM3 (ref 4.37–5.74)
SCAN SLIDE: NORMAL
SMALL PLATELETS BLD QL SMEAR: NORMAL
SODIUM BLD-SCNC: 143 MMOL/L (ref 137–145)
SP GR UR STRIP: 1.01 (ref 1–1.03)
T4 FREE SERPL-MCNC: 1.46 NG/DL (ref 0.78–2.19)
TRIGL SERPL-MCNC: 123 MG/DL
TSH SERPL DL<=0.05 MIU/L-ACNC: 1.4 MIU/ML (ref 0.46–4.68)
UROBILINOGEN UR QL STRIP: NORMAL
VLDLC SERPL-MCNC: 24.6 MG/DL
WBC MORPH BLD: NORMAL
WBC NRBC COR # BLD: 10 10*3/MM3 (ref 3.2–9.8)

## 2018-11-08 PROCEDURE — 82570 ASSAY OF URINE CREATININE: CPT | Performed by: INTERNAL MEDICINE

## 2018-11-08 PROCEDURE — 84439 ASSAY OF FREE THYROXINE: CPT | Performed by: INTERNAL MEDICINE

## 2018-11-08 PROCEDURE — 82043 UR ALBUMIN QUANTITATIVE: CPT | Performed by: INTERNAL MEDICINE

## 2018-11-08 PROCEDURE — 83036 HEMOGLOBIN GLYCOSYLATED A1C: CPT | Performed by: INTERNAL MEDICINE

## 2018-11-08 PROCEDURE — 36415 COLL VENOUS BLD VENIPUNCTURE: CPT | Performed by: INTERNAL MEDICINE

## 2018-11-08 PROCEDURE — 80061 LIPID PANEL: CPT | Performed by: INTERNAL MEDICINE

## 2018-11-08 PROCEDURE — 80053 COMPREHEN METABOLIC PANEL: CPT | Performed by: INTERNAL MEDICINE

## 2018-11-08 PROCEDURE — 85025 COMPLETE CBC W/AUTO DIFF WBC: CPT | Performed by: INTERNAL MEDICINE

## 2018-11-08 PROCEDURE — 84443 ASSAY THYROID STIM HORMONE: CPT | Performed by: INTERNAL MEDICINE

## 2018-11-08 PROCEDURE — 81003 URINALYSIS AUTO W/O SCOPE: CPT | Performed by: INTERNAL MEDICINE

## 2018-11-16 ENCOUNTER — OFFICE VISIT (OUTPATIENT)
Dept: FAMILY MEDICINE CLINIC | Facility: CLINIC | Age: 63
End: 2018-11-16

## 2018-11-16 VITALS
WEIGHT: 225 LBS | HEIGHT: 72 IN | BODY MASS INDEX: 30.48 KG/M2 | TEMPERATURE: 97.6 F | DIASTOLIC BLOOD PRESSURE: 78 MMHG | SYSTOLIC BLOOD PRESSURE: 126 MMHG | HEART RATE: 76 BPM

## 2018-11-16 DIAGNOSIS — I10 ESSENTIAL HYPERTENSION: Chronic | ICD-10-CM

## 2018-11-16 DIAGNOSIS — Z23 INFLUENZA VACCINATION ADMINISTERED AT CURRENT VISIT: ICD-10-CM

## 2018-11-16 DIAGNOSIS — Z12.5 SCREENING FOR PROSTATE CANCER: ICD-10-CM

## 2018-11-16 DIAGNOSIS — D50.8 IRON DEFICIENCY ANEMIA SECONDARY TO INADEQUATE DIETARY IRON INTAKE: Chronic | ICD-10-CM

## 2018-11-16 DIAGNOSIS — E11.9 TYPE 2 DIABETES MELLITUS WITHOUT COMPLICATION, WITHOUT LONG-TERM CURRENT USE OF INSULIN (HCC): Primary | Chronic | ICD-10-CM

## 2018-11-16 DIAGNOSIS — E78.2 MIXED HYPERLIPIDEMIA: Chronic | ICD-10-CM

## 2018-11-16 PROBLEM — B18.2 CHRONIC HEPATITIS C WITHOUT HEPATIC COMA: Chronic | Status: ACTIVE | Noted: 2017-04-28

## 2018-11-16 PROBLEM — S98.129A: Status: ACTIVE | Noted: 2018-11-16

## 2018-11-16 PROBLEM — IMO0002 LACERATION: Status: ACTIVE | Noted: 2018-11-16

## 2018-11-16 PROBLEM — D64.9 ANEMIA: Status: ACTIVE | Noted: 2018-11-16

## 2018-11-16 PROBLEM — S92.919A FRACTURE, TOE: Status: ACTIVE | Noted: 2018-11-16

## 2018-11-16 PROCEDURE — 90674 CCIIV4 VAC NO PRSV 0.5 ML IM: CPT | Performed by: INTERNAL MEDICINE

## 2018-11-16 PROCEDURE — 99214 OFFICE O/P EST MOD 30 MIN: CPT | Performed by: INTERNAL MEDICINE

## 2018-11-16 PROCEDURE — 90471 IMMUNIZATION ADMIN: CPT | Performed by: INTERNAL MEDICINE

## 2018-11-16 RX ORDER — IRON POLYSACCHARIDE COMPLEX 150 MG
150 CAPSULE ORAL DAILY
Qty: 30 CAPSULE | Refills: 11 | Status: SHIPPED | OUTPATIENT
Start: 2018-11-16 | End: 2019-11-22 | Stop reason: SDUPTHER

## 2018-11-16 RX ORDER — AMLODIPINE BESYLATE 5 MG/1
5 TABLET ORAL DAILY
Qty: 90 TABLET | Refills: 3 | Status: SHIPPED | OUTPATIENT
Start: 2018-11-16 | End: 2019-05-28 | Stop reason: SDUPTHER

## 2018-11-16 RX ORDER — LISINOPRIL AND HYDROCHLOROTHIAZIDE 25; 20 MG/1; MG/1
1 TABLET ORAL DAILY
Qty: 90 TABLET | Refills: 3 | Status: SHIPPED | OUTPATIENT
Start: 2018-11-16 | End: 2019-05-28 | Stop reason: SDUPTHER

## 2018-11-16 RX ORDER — CETIRIZINE HYDROCHLORIDE 10 MG/1
10 TABLET ORAL DAILY
COMMUNITY

## 2018-11-16 RX ORDER — PRAVASTATIN SODIUM 80 MG/1
80 TABLET ORAL DAILY
Qty: 90 TABLET | Refills: 3 | Status: SHIPPED | OUTPATIENT
Start: 2018-11-16 | End: 2019-06-13 | Stop reason: SDUPTHER

## 2018-11-16 RX ORDER — METFORMIN HYDROCHLORIDE 500 MG/1
1000 TABLET, EXTENDED RELEASE ORAL 2 TIMES DAILY
Qty: 360 TABLET | Refills: 3 | Status: SHIPPED | OUTPATIENT
Start: 2018-11-16 | End: 2019-06-13 | Stop reason: SDUPTHER

## 2018-11-16 NOTE — PROGRESS NOTES
"Chief Complaint   Patient presents with   • Diabetes     FSBS once daily   • Hypertension     F/U on lab results.   • Hyperlipidemia   • Establish Care     Subjective   Tao Cuadra is a 63 y.o. male who presents to the office to establish care and for review of labs.  He has diabetes and his blood sugar has been controlled.  He takes metformin for treatment of his diabetes.  He has hypertension and his blood pressure has been doing well.  He takes lisinopril HCT and amlodipine.  He has hyperlipidemia and takes pravastatin daily.  He has a history of iron deficiency anemia and takes a daily iron supplement.    The following portions of the patient's history were reviewed and updated as appropriate: allergies, current medications, past family history, past medical history, past social history, past surgical history and problem list.    Review of Systems   Constitutional: Negative for chills, fatigue and fever.   HENT: Negative for congestion, sneezing, sore throat and trouble swallowing.    Eyes: Negative for visual disturbance.   Respiratory: Negative for cough, chest tightness, shortness of breath and wheezing.    Cardiovascular: Negative for chest pain, palpitations and leg swelling.   Gastrointestinal: Negative for abdominal pain, constipation, diarrhea, nausea and vomiting.   Genitourinary: Negative for dysuria, frequency and urgency.   Musculoskeletal: Negative for neck pain.   Skin: Negative for rash.   Neurological: Negative for dizziness, weakness and headaches.   Psychiatric/Behavioral:        Patient denies any feelings of depression and has not felt down, hopeless or lost interest in any activities.   All other systems reviewed and are negative.      Objective   Vitals:    11/16/18 1027   BP: 126/78   BP Location: Left arm   Patient Position: Sitting   Cuff Size: Adult   Pulse: 76   Temp: 97.6 °F (36.4 °C)   TempSrc: Oral   Weight: 102 kg (225 lb)   Height: 181.6 cm (71.5\")   PainSc: 0-No pain "     Physical Exam   Constitutional: He is oriented to person, place, and time. He appears well-developed and well-nourished. No distress.   HENT:   Head: Normocephalic and atraumatic.   Nose: Nose normal.   Mouth/Throat: Oropharynx is clear and moist. No oropharyngeal exudate.   Eyes: Conjunctivae and EOM are normal. Pupils are equal, round, and reactive to light. No scleral icterus.   Neck: Normal range of motion. Neck supple.   Cardiovascular: Normal rate, regular rhythm and normal heart sounds. Exam reveals no gallop and no friction rub.   No murmur heard.  Pulmonary/Chest: Effort normal and breath sounds normal. No respiratory distress. He has no wheezes. He has no rales.   Abdominal: Soft. Bowel sounds are normal. He exhibits no distension. There is no tenderness. There is no rebound and no guarding.   Musculoskeletal: Normal range of motion. He exhibits no edema.   Lymphadenopathy:     He has no cervical adenopathy.   Neurological: He is alert and oriented to person, place, and time. No cranial nerve deficit.   Skin: Skin is warm and dry. No rash noted.   Psychiatric: He has a normal mood and affect. His behavior is normal. Judgment and thought content normal.   Nursing note and vitals reviewed.      Assessment/Plan   Tao was seen today for diabetes, hypertension, hyperlipidemia and establish care.    Diagnoses and all orders for this visit:    Type 2 diabetes mellitus without complication, without long-term current use of insulin (CMS/[facility])  -     Hemoglobin A1c; Future  -     metFORMIN ER (GLUCOPHAGE-XR) 500 MG 24 hr tablet; Take 2 tablets by mouth 2 (Two) Times a Day.    Essential hypertension  -     Comprehensive Metabolic Panel; Future  -     T4, Free; Future  -     TSH; Future  -     Urinalysis With Culture If Indicated - Urine, Clean Catch; Future  -     lisinopril-hydrochlorothiazide (PRINZIDE,ZESTORETIC) 20-25 MG per tablet; Take 1 tablet by mouth Daily.  -     amLODIPine (NORVASC) 5 MG tablet;  Take 1 tablet by mouth Daily.    Mixed hyperlipidemia  -     Lipid Panel; Future  -     pravastatin (PRAVACHOL) 80 MG tablet; Take 1 tablet by mouth Daily.    Iron deficiency anemia secondary to inadequate dietary iron intake  -     CBC & Differential; Future  -     iron polysaccharides (FERREX 150) 150 MG capsule; Take 1 capsule by mouth Daily.    Screening for prostate cancer  -     PSA Screen; Future    Influenza vaccination administered at current visit  -     Flucelvax Quad=>4Years (PFS)         Labs are reviewed with patient.  His diabetes is well controlled.  He will continue with metformin.  He may monitor blood sugar one time daily.  His lipids are at goal and he will continue with pravastatin.  He has mild anemia which is stable.  He will continue with the daily iron supplement.  His blood pressure is controlled, and he will continue with his current blood pressure medication.    Patient is given a flu shot today.     PHQ-2/PHQ-9 Depression Screening 11/16/2018   Little interest or pleasure in doing things 0   Feeling down, depressed, or hopeless 0   Trouble falling or staying asleep, or sleeping too much -   Feeling tired or having little energy -   Poor appetite or overeating -   Feeling bad about yourself - or that you are a failure or have let yourself or your family down -   Trouble concentrating on things, such as reading the newspaper or watching television -   Moving or speaking so slowly that other people could have noticed. Or the opposite - being so fidgety or restless that you have been moving around a lot more than usual -   Thoughts that you would be better off dead, or of hurting yourself in some way -   Total Score 0   If you checked off any problems, how difficult have these problems made it for you to do your work, take care of things at home, or get along with other people? -         Lab on 11/08/2018   Component Date Value Ref Range Status   • Glucose 11/08/2018 112* 74 - 99 mg/dL Final    • BUN 11/08/2018 17  9 - 20 mg/dL Final   • Creatinine 11/08/2018 1.01  0.66 - 1.25 mg/dL Final   • Sodium 11/08/2018 143  137 - 145 mmol/L Final   • Potassium 11/08/2018 4.0  3.4 - 5.0 mmol/L Final   • Chloride 11/08/2018 104  98 - 107 mmol/L Final   • CO2 11/08/2018 27.0  22.0 - 30.0 mmol/L Final   • Calcium 11/08/2018 9.8  8.4 - 10.2 mg/dL Final   • Total Protein 11/08/2018 8.7* 6.3 - 8.2 g/dL Final   • Albumin 11/08/2018 4.60  3.50 - 5.00 g/dL Final   • ALT (SGPT) 11/08/2018 23  <=50 U/L Final   • AST (SGOT) 11/08/2018 26  17 - 59 U/L Final   • Alkaline Phosphatase 11/08/2018 79  38 - 126 U/L Final   • Total Bilirubin 11/08/2018 0.7  0.2 - 1.3 mg/dL Final   • eGFR   Amer 11/08/2018 90  49 - 113 mL/min/1.73 Final   • Globulin 11/08/2018 4.1* 2.3 - 3.5 gm/dL Final   • A/G Ratio 11/08/2018 1.1  1.1 - 1.8 g/dL Final   • BUN/Creatinine Ratio 11/08/2018 16.8  7.0 - 25.0 Final   • Anion Gap 11/08/2018 12.0  5.0 - 15.0 mmol/L Final   • Color, UA 11/08/2018 Yellow  Yellow, Straw Final   • Appearance, UA 11/08/2018 Clear  Clear Final   • pH, UA 11/08/2018 7.0  5.5 - 8.0 Final   • Specific Gravity, UA 11/08/2018 1.015  1.005 - 1.030 Final   • Glucose, UA 11/08/2018 Negative  Negative Final   • Ketones, UA 11/08/2018 Negative  Negative Final   • Bilirubin, UA 11/08/2018 Negative  Negative Final   • Blood, UA 11/08/2018 Negative  Negative Final   • Protein, UA 11/08/2018 Negative  Negative Final   • Leuk Esterase, UA 11/08/2018 Negative  Negative Final   • Nitrite, UA 11/08/2018 Negative  Negative Final   • Urobilinogen, UA 11/08/2018 0.2 E.U./dL  0.2 - 1.0 E.U./dL Final   • Total Cholesterol 11/08/2018 124* 150 - 200 mg/dL Final   • Triglycerides 11/08/2018 123  <=150 mg/dL Final   • HDL Cholesterol 11/08/2018 32* 40 - 59 mg/dL Final   • LDL Cholesterol  11/08/2018 67  <=100 mg/dL Final   • VLDL Cholesterol 11/08/2018 24.6  mg/dL Final   • LDL/HDL Ratio 11/08/2018 2.11  0.00 - 3.55 Final   • TSH 11/08/2018 1.400   0.460 - 4.680 mIU/mL Final   • Free T4 11/08/2018 1.46  0.78 - 2.19 ng/dL Final   • Hemoglobin A1C 11/08/2018 6.8* 4 - 5.6 % Final   • Microalbumin/Creatinine Ratio 11/08/2018   0.0 - 30.0 mg/g Final    Unable to calculate   • Creatinine, Urine 11/08/2018 77.4  mg/dL Final   • Microalbumin, Urine 11/08/2018 <0.6  mg/L Final   • WBC 11/08/2018 10.00* 3.20 - 9.80 10*3/mm3 Final   • RBC 11/08/2018 5.64  4.37 - 5.74 10*6/mm3 Final   • Hemoglobin 11/08/2018 12.1* 13.7 - 17.3 g/dL Final   • Hematocrit 11/08/2018 36.7* 39.0 - 49.0 % Final   • MCV 11/08/2018 65.1* 80.0 - 98.0 fL Final   • MCH 11/08/2018 21.5* 26.5 - 34.0 pg Final   • MCHC 11/08/2018 33.0  31.5 - 36.3 g/dL Final   • RDW 11/08/2018 17.7* 11.5 - 14.5 % Final   • RDW-SD 11/08/2018 39.0  35.1 - 43.9 fl Final   • MPV 11/08/2018 0.0* 8.0 - 12.0 fL Final   • Platelets 11/08/2018 141* 150 - 450 10*3/mm3 Final   • Scan Slide 11/08/2018    Final    See Manual Differential Results   • Neutrophil % 11/08/2018 50.0  37.0 - 80.0 % Final   • Lymphocyte % 11/08/2018 41.0  10.0 - 50.0 % Final   • Monocyte % 11/08/2018 6.0  0.0 - 12.0 % Final   • Eosinophil % 11/08/2018 3.0  0.0 - 7.0 % Final   • Neutrophils Absolute 11/08/2018 5.00  2.00 - 8.60 10*3/mm3 Final   • Lymphocytes Absolute 11/08/2018 4.10  0.60 - 4.20 10*3/mm3 Final   • Monocytes Absolute 11/08/2018 0.60  0.00 - 0.90 10*3/mm3 Final   • Eosinophils Absolute 11/08/2018 0.30  0.00 - 0.70 10*3/mm3 Final   • Anisocytosis 11/08/2018 Slight/1+  None Seen Final   • Hypochromia 11/08/2018 Slight/1+  None Seen Final   • Microcytes 11/08/2018 Slight/1+  None Seen Final   • WBC Morphology 11/08/2018 Normal  Normal Final   • Platelet Estimate 11/08/2018 Decreased  Normal Final   • Large Platelets 11/08/2018 Slight/1+  None Seen Final   ]

## 2018-11-30 ENCOUNTER — OFFICE VISIT (OUTPATIENT)
Dept: FAMILY MEDICINE CLINIC | Facility: CLINIC | Age: 63
End: 2018-11-30

## 2018-11-30 VITALS
WEIGHT: 227 LBS | TEMPERATURE: 97.5 F | HEART RATE: 72 BPM | HEIGHT: 72 IN | BODY MASS INDEX: 30.75 KG/M2 | DIASTOLIC BLOOD PRESSURE: 76 MMHG | SYSTOLIC BLOOD PRESSURE: 136 MMHG

## 2018-11-30 DIAGNOSIS — J01.00 ACUTE NON-RECURRENT MAXILLARY SINUSITIS: Primary | ICD-10-CM

## 2018-11-30 PROCEDURE — 96372 THER/PROPH/DIAG INJ SC/IM: CPT | Performed by: INTERNAL MEDICINE

## 2018-11-30 PROCEDURE — 99213 OFFICE O/P EST LOW 20 MIN: CPT | Performed by: INTERNAL MEDICINE

## 2018-11-30 RX ORDER — METHYLPREDNISOLONE ACETATE 80 MG/ML
80 INJECTION, SUSPENSION INTRA-ARTICULAR; INTRALESIONAL; INTRAMUSCULAR; SOFT TISSUE ONCE
Status: COMPLETED | OUTPATIENT
Start: 2018-11-30 | End: 2018-11-30

## 2018-11-30 RX ORDER — CEFDINIR 300 MG/1
300 CAPSULE ORAL 2 TIMES DAILY
Qty: 20 CAPSULE | Refills: 0 | Status: SHIPPED | OUTPATIENT
Start: 2018-11-30 | End: 2018-12-10

## 2018-11-30 RX ADMIN — METHYLPREDNISOLONE ACETATE 80 MG: 80 INJECTION, SUSPENSION INTRA-ARTICULAR; INTRALESIONAL; INTRAMUSCULAR; SOFT TISSUE at 13:31

## 2018-11-30 NOTE — PROGRESS NOTES
"Chief Complaint   Patient presents with   • Nasal Congestion     c/o congestion, cough, watery eyes x 1 week     Subjective   Tao Cuadra is a 63 y.o. male who presents to the office complaining of nasal congestion, sinus pressure, sneezing and a sore throat which started last week.  He has also had a cough productive of a thick sputum.  His symptoms started after he had been working outside in the yard.  He has been using an over-the-counter decongestant with no significant improvement.    The following portions of the patient's history were reviewed and updated as appropriate: allergies, current medications, past family history, past medical history, past social history, past surgical history and problem list.    Review of Systems   Constitutional: Negative for chills, fatigue and fever.   HENT: Positive for congestion, sinus pressure, sneezing and sore throat. Negative for trouble swallowing.    Eyes: Negative for visual disturbance.   Respiratory: Positive for cough. Negative for chest tightness, shortness of breath and wheezing.    Cardiovascular: Negative for chest pain, palpitations and leg swelling.   Gastrointestinal: Negative for abdominal pain, constipation, diarrhea, nausea and vomiting.   Genitourinary: Negative for dysuria, frequency and urgency.   Musculoskeletal: Negative for neck pain.   Skin: Negative for rash.   Neurological: Negative for dizziness, weakness and headaches.   Psychiatric/Behavioral:        Patient denies any feelings of depression and has not felt down, hopeless or lost interest in any activities.   All other systems reviewed and are negative.      Objective   Vitals:    11/30/18 1306   BP: 136/76   BP Location: Left arm   Patient Position: Sitting   Cuff Size: Adult   Pulse: 72   Temp: 97.5 °F (36.4 °C)   TempSrc: Oral   Weight: 103 kg (227 lb)   Height: 181.6 cm (71.5\")   PainSc: 0-No pain     Physical Exam   Constitutional: He is oriented to person, place, and time. He " appears well-developed and well-nourished. No distress.   HENT:   Head: Normocephalic and atraumatic.   Nose: Right sinus exhibits maxillary sinus tenderness. Left sinus exhibits maxillary sinus tenderness.   Mouth/Throat: Posterior oropharyngeal erythema present. No oropharyngeal exudate.   Nose is congested   Eyes: Conjunctivae and EOM are normal. Pupils are equal, round, and reactive to light. No scleral icterus.   Neck: Normal range of motion. Neck supple.   Cardiovascular: Normal rate, regular rhythm and normal heart sounds. Exam reveals no gallop and no friction rub.   No murmur heard.  Pulmonary/Chest: Effort normal and breath sounds normal. No respiratory distress. He has no wheezes. He has no rales.   Abdominal: Soft. Bowel sounds are normal. He exhibits no distension. There is no tenderness. There is no rebound and no guarding.   Musculoskeletal: Normal range of motion. He exhibits no edema.   Lymphadenopathy:     He has no cervical adenopathy.   Neurological: He is alert and oriented to person, place, and time. No cranial nerve deficit.   Skin: Skin is warm and dry. No rash noted.   Psychiatric: He has a normal mood and affect. His behavior is normal. Judgment and thought content normal.   Nursing note and vitals reviewed.      Assessment/Plan   Tao was seen today for nasal congestion.    Diagnoses and all orders for this visit:    Acute non-recurrent maxillary sinusitis  -     cefdinir (OMNICEF) 300 MG capsule; Take 1 capsule by mouth 2 (Two) Times a Day for 10 days.  -     methylPREDNISolone acetate (DEPO-medrol) injection 80 mg; Inject 1 mL into the appropriate muscle as directed by prescriber 1 (One) Time.    He is given Omnicef for treatment of the sinusitis.  He is also given Depo-Medrol 80 mg IM.  He may use Mucinex to help with the congestion.         PHQ-2/PHQ-9 Depression Screening 11/30/2018   Little interest or pleasure in doing things 0   Feeling down, depressed, or hopeless 0   Trouble  falling or staying asleep, or sleeping too much -   Feeling tired or having little energy -   Poor appetite or overeating -   Feeling bad about yourself - or that you are a failure or have let yourself or your family down -   Trouble concentrating on things, such as reading the newspaper or watching television -   Moving or speaking so slowly that other people could have noticed. Or the opposite - being so fidgety or restless that you have been moving around a lot more than usual -   Thoughts that you would be better off dead, or of hurting yourself in some way -   Total Score 0   If you checked off any problems, how difficult have these problems made it for you to do your work, take care of things at home, or get along with other people? -

## 2018-12-13 ENCOUNTER — TELEPHONE (OUTPATIENT)
Dept: FAMILY MEDICINE CLINIC | Facility: CLINIC | Age: 63
End: 2018-12-13

## 2018-12-13 NOTE — TELEPHONE ENCOUNTER
He should not need any additional antibiotics.  His current symptoms sound more allergy related.  He should take a daily long-acting antihistamine such as Claritin, Allegra or Zyrtec.

## 2018-12-13 NOTE — TELEPHONE ENCOUNTER
"Patient phoned stated \"I finished the Cefdinir 300mg that Dr. Hodges gave me at my last visit on 11/30/2018. I do feel better, but I am still having s/s watery eyes, HA and Nasal congestion. Can Dr. Hodges send in a refill or something else to [facility]\".   "

## 2019-05-06 ENCOUNTER — LAB (OUTPATIENT)
Dept: LAB | Facility: OTHER | Age: 64
End: 2019-05-06

## 2019-05-06 DIAGNOSIS — E78.2 MIXED HYPERLIPIDEMIA: Chronic | ICD-10-CM

## 2019-05-06 DIAGNOSIS — E11.9 TYPE 2 DIABETES MELLITUS WITHOUT COMPLICATION, WITHOUT LONG-TERM CURRENT USE OF INSULIN (HCC): Chronic | ICD-10-CM

## 2019-05-06 DIAGNOSIS — D50.8 IRON DEFICIENCY ANEMIA SECONDARY TO INADEQUATE DIETARY IRON INTAKE: ICD-10-CM

## 2019-05-06 DIAGNOSIS — Z12.5 SCREENING FOR PROSTATE CANCER: ICD-10-CM

## 2019-05-06 DIAGNOSIS — I10 ESSENTIAL HYPERTENSION: Chronic | ICD-10-CM

## 2019-05-06 LAB
ALBUMIN SERPL-MCNC: 4.6 G/DL (ref 3.5–5)
ALBUMIN/GLOB SERPL: 1.2 G/DL (ref 1.1–1.8)
ALP SERPL-CCNC: 81 U/L (ref 38–126)
ALT SERPL W P-5'-P-CCNC: 26 U/L
ANION GAP SERPL CALCULATED.3IONS-SCNC: 11 MMOL/L (ref 5–15)
ANISOCYTOSIS BLD QL: NORMAL
AST SERPL-CCNC: 26 U/L (ref 17–59)
BILIRUB SERPL-MCNC: 0.7 MG/DL (ref 0.2–1.3)
BILIRUB UR QL STRIP: NEGATIVE
BUN BLD-MCNC: 14 MG/DL (ref 7–23)
BUN/CREAT SERPL: 14.9 (ref 7–25)
CALCIUM SPEC-SCNC: 9.7 MG/DL (ref 8.4–10.2)
CHLORIDE SERPL-SCNC: 105 MMOL/L (ref 101–112)
CHOLEST SERPL-MCNC: 117 MG/DL (ref 150–200)
CLARITY UR: CLEAR
CO2 SERPL-SCNC: 26 MMOL/L (ref 22–30)
COLOR UR: YELLOW
CREAT BLD-MCNC: 0.94 MG/DL (ref 0.7–1.3)
DEPRECATED RDW RBC AUTO: 39.7 FL (ref 37–54)
ERYTHROCYTE [DISTWIDTH] IN BLOOD BY AUTOMATED COUNT: 17.7 % (ref 12.3–15.4)
GFR SERPL CREATININE-BSD FRML MDRD: 98 ML/MIN/1.73 (ref 49–113)
GLOBULIN UR ELPH-MCNC: 3.7 GM/DL (ref 2.3–3.5)
GLUCOSE BLD-MCNC: 124 MG/DL (ref 70–99)
GLUCOSE UR STRIP-MCNC: NEGATIVE MG/DL
HBA1C MFR BLD: 6.4 % (ref 4.8–5.6)
HCT VFR BLD AUTO: 37.8 % (ref 37.5–51)
HDLC SERPL-MCNC: 31 MG/DL (ref 40–59)
HGB BLD-MCNC: 12.4 G/DL (ref 13–17.7)
HGB UR QL STRIP.AUTO: NEGATIVE
HYPOCHROMIA BLD QL: NORMAL
KETONES UR QL STRIP: NEGATIVE
LARGE PLATELETS: NORMAL
LDLC SERPL CALC-MCNC: 66 MG/DL
LDLC/HDLC SERPL: 2.13 {RATIO} (ref 0–3.55)
LEUKOCYTE ESTERASE UR QL STRIP.AUTO: NEGATIVE
LYMPHOCYTES # BLD MANUAL: 2.76 10*3/MM3 (ref 0.7–3.1)
LYMPHOCYTES NFR BLD MANUAL: 34 % (ref 19.6–45.3)
LYMPHOCYTES NFR BLD MANUAL: 9 % (ref 5–12)
MCH RBC QN AUTO: 22 PG (ref 26.6–33)
MCHC RBC AUTO-ENTMCNC: 32.8 G/DL (ref 31.5–35.7)
MCV RBC AUTO: 67 FL (ref 79–97)
MICROCYTES BLD QL: NORMAL
MONOCYTES # BLD AUTO: 0.73 10*3/MM3 (ref 0.1–0.9)
NEUTROPHILS # BLD AUTO: 4.63 10*3/MM3 (ref 1.7–7)
NEUTROPHILS NFR BLD MANUAL: 57 % (ref 42.7–76)
NITRITE UR QL STRIP: NEGATIVE
PH UR STRIP.AUTO: 5.5 [PH] (ref 5.5–8)
PLATELET # BLD AUTO: 104 10*3/MM3 (ref 140–450)
PMV BLD AUTO: 0 FL (ref 6–12)
POTASSIUM BLD-SCNC: 3.8 MMOL/L (ref 3.4–5)
PROT SERPL-MCNC: 8.3 G/DL (ref 6.3–8.6)
PROT UR QL STRIP: NEGATIVE
PSA SERPL-MCNC: 1.05 NG/ML (ref 0–4)
RBC # BLD AUTO: 5.64 10*6/MM3 (ref 4.14–5.8)
SCAN SLIDE: NORMAL
SMALL PLATELETS BLD QL SMEAR: NORMAL
SODIUM BLD-SCNC: 142 MMOL/L (ref 137–145)
SP GR UR STRIP: 1.01 (ref 1–1.03)
T4 FREE SERPL-MCNC: 1.38 NG/DL (ref 0.93–1.7)
TRIGL SERPL-MCNC: 100 MG/DL
TSH SERPL DL<=0.05 MIU/L-ACNC: 1.53 MIU/ML (ref 0.27–4.2)
UROBILINOGEN UR QL STRIP: NORMAL
VLDLC SERPL-MCNC: 20 MG/DL
WBC MORPH BLD: NORMAL
WBC NRBC COR # BLD: 8.13 10*3/MM3 (ref 3.4–10.8)

## 2019-05-06 PROCEDURE — 84443 ASSAY THYROID STIM HORMONE: CPT | Performed by: INTERNAL MEDICINE

## 2019-05-06 PROCEDURE — 36415 COLL VENOUS BLD VENIPUNCTURE: CPT | Performed by: INTERNAL MEDICINE

## 2019-05-06 PROCEDURE — G0103 PSA SCREENING: HCPCS | Performed by: INTERNAL MEDICINE

## 2019-05-06 PROCEDURE — 83036 HEMOGLOBIN GLYCOSYLATED A1C: CPT | Performed by: INTERNAL MEDICINE

## 2019-05-06 PROCEDURE — 84439 ASSAY OF FREE THYROXINE: CPT | Performed by: INTERNAL MEDICINE

## 2019-05-06 PROCEDURE — 81003 URINALYSIS AUTO W/O SCOPE: CPT | Performed by: INTERNAL MEDICINE

## 2019-05-06 PROCEDURE — 80061 LIPID PANEL: CPT | Performed by: INTERNAL MEDICINE

## 2019-05-06 PROCEDURE — 85025 COMPLETE CBC W/AUTO DIFF WBC: CPT | Performed by: INTERNAL MEDICINE

## 2019-05-06 PROCEDURE — 80053 COMPREHEN METABOLIC PANEL: CPT | Performed by: INTERNAL MEDICINE

## 2019-05-17 ENCOUNTER — OFFICE VISIT (OUTPATIENT)
Dept: FAMILY MEDICINE CLINIC | Facility: CLINIC | Age: 64
End: 2019-05-17

## 2019-05-17 VITALS
BODY MASS INDEX: 31.02 KG/M2 | WEIGHT: 229 LBS | HEART RATE: 74 BPM | DIASTOLIC BLOOD PRESSURE: 80 MMHG | SYSTOLIC BLOOD PRESSURE: 132 MMHG | HEIGHT: 72 IN

## 2019-05-17 DIAGNOSIS — M76.52 PATELLAR TENDINITIS OF LEFT KNEE: ICD-10-CM

## 2019-05-17 DIAGNOSIS — D50.8 IRON DEFICIENCY ANEMIA SECONDARY TO INADEQUATE DIETARY IRON INTAKE: Chronic | ICD-10-CM

## 2019-05-17 DIAGNOSIS — E78.2 MIXED HYPERLIPIDEMIA: Chronic | ICD-10-CM

## 2019-05-17 DIAGNOSIS — Z23 PNEUMOCOCCAL VACCINATION GIVEN: ICD-10-CM

## 2019-05-17 DIAGNOSIS — E11.9 TYPE 2 DIABETES MELLITUS WITHOUT COMPLICATION, WITHOUT LONG-TERM CURRENT USE OF INSULIN (HCC): Primary | Chronic | ICD-10-CM

## 2019-05-17 DIAGNOSIS — I10 ESSENTIAL HYPERTENSION: Chronic | ICD-10-CM

## 2019-05-17 PROBLEM — B18.2 CHRONIC HEPATITIS C WITHOUT HEPATIC COMA: Chronic | Status: RESOLVED | Noted: 2017-04-28 | Resolved: 2019-05-17

## 2019-05-17 PROBLEM — IMO0002 LACERATION: Status: RESOLVED | Noted: 2018-11-16 | Resolved: 2019-05-17

## 2019-05-17 PROCEDURE — 99214 OFFICE O/P EST MOD 30 MIN: CPT | Performed by: INTERNAL MEDICINE

## 2019-05-17 PROCEDURE — G0009 ADMIN PNEUMOCOCCAL VACCINE: HCPCS | Performed by: INTERNAL MEDICINE

## 2019-05-17 PROCEDURE — 90670 PCV13 VACCINE IM: CPT | Performed by: INTERNAL MEDICINE

## 2019-05-17 RX ORDER — MELOXICAM 15 MG/1
15 TABLET ORAL DAILY
Qty: 30 TABLET | Refills: 1 | Status: SHIPPED | OUTPATIENT
Start: 2019-05-17 | End: 2020-09-15

## 2019-05-17 NOTE — PROGRESS NOTES
Chief Complaint   Patient presents with   • Hypertension     f/u on lab results   • Hyperlipidemia   • Diabetes     FSBS once daily   • Knee Pain     x 1 month     Subjective   Tao Cuadra is a 64 y.o. male who presents to the office for follow-up and review of labs.  He has diabetes and his blood sugar has been controlled.  He takes metformin for treatment of his diabetes.  He has hypertension and his blood pressure has been doing well.  He takes lisinopril HCT and amlodipine.  He has hyperlipidemia and takes pravastatin daily.  He has iron deficiency anemia and takes a daily iron supplement.  He was diagnosed and treated for hepatitis C a couple of years ago.  He has now completed all follow-ups with his gastroenterologist and has been released from care.  He has no evidence of hepatitis C.    He complains of pain in his left knee which started approximately 1 month ago.  This was gradual in onset.  He noticed it when he was walking up steps.  He complains of pain just below the kneecap.  He has not taken any medication for this.    The following portions of the patient's history were reviewed and updated as appropriate: allergies, current medications, past family history, past medical history, past social history, past surgical history and problem list.    Review of Systems   Constitutional: Negative for chills, fatigue and fever.   HENT: Negative for congestion, sneezing, sore throat and trouble swallowing.    Eyes: Negative for visual disturbance.   Respiratory: Negative for cough, chest tightness, shortness of breath and wheezing.    Cardiovascular: Negative for chest pain, palpitations and leg swelling.   Gastrointestinal: Negative for abdominal pain, constipation, diarrhea, nausea and vomiting.   Genitourinary: Negative for dysuria, frequency and urgency.   Musculoskeletal: Negative for neck pain.   Skin: Negative for rash.   Neurological: Negative for dizziness, weakness and headaches.  "  Psychiatric/Behavioral:        Patient denies any feelings of depression and has not felt down, hopeless or lost interest in any activities.   All other systems reviewed and are negative.      Objective   Vitals:    05/17/19 1004   BP: 132/80   BP Location: Left arm   Patient Position: Sitting   Pulse: 74   Weight: 104 kg (229 lb)   Height: 182.2 cm (71.75\")   PainSc:   6   PainLoc: Knee  Comment: left knee     Physical Exam   Constitutional: He is oriented to person, place, and time. He appears well-developed and well-nourished. No distress.   HENT:   Head: Normocephalic and atraumatic.   Nose: Nose normal.   Mouth/Throat: Oropharynx is clear and moist. No oropharyngeal exudate.   Eyes: Conjunctivae and EOM are normal. Pupils are equal, round, and reactive to light. No scleral icterus.   Neck: Normal range of motion. Neck supple.   Cardiovascular: Normal rate, regular rhythm and normal heart sounds. Exam reveals no gallop and no friction rub.   No murmur heard.  Pulmonary/Chest: Effort normal and breath sounds normal. No respiratory distress. He has no wheezes. He has no rales.   Abdominal: Soft. Bowel sounds are normal. He exhibits no distension. There is no tenderness. There is no rebound and no guarding.   Musculoskeletal: Normal range of motion. He exhibits no edema.   There is tenderness to palpation of the patellar tendon inferior to the patella.  He has full range of motion of the knee.  Negative patellar grind.  Negative posterior drawer and Lachman's.   Lymphadenopathy:     He has no cervical adenopathy.   Neurological: He is alert and oriented to person, place, and time. No cranial nerve deficit.   Skin: Skin is warm and dry. No rash noted.   Psychiatric: He has a normal mood and affect. His behavior is normal. Judgment and thought content normal.   Nursing note and vitals reviewed.      Assessment/Plan   Tao was seen today for hypertension, hyperlipidemia, diabetes and knee pain.    Diagnoses and " all orders for this visit:    Type 2 diabetes mellitus without complication, without long-term current use of insulin (CMS/[facility])  -     Hemoglobin A1c; Future  -     Microalbumin / Creatinine Urine Ratio - Urine, Clean Catch; Future    Essential hypertension  -     Comprehensive Metabolic Panel; Future  -     T4, Free; Future  -     TSH; Future  -     Urinalysis With Culture If Indicated - Urine, Clean Catch; Future    Mixed hyperlipidemia  -     Lipid Panel; Future    Iron deficiency anemia secondary to inadequate dietary iron intake  -     CBC & Differential; Future    Patellar tendinitis of left knee  -     meloxicam (MOBIC) 15 MG tablet; Take 1 tablet by mouth Daily.    Pneumococcal vaccination given  -     Pneumococcal Conjugate Vaccine 13-Valent All         Labs are reviewed with patient.  His glucose is 124 with hemoglobin A1c of 6.40. His diabetes is well controlled.  He will continue with metformin.  He may monitor blood sugar one time daily.  His lipids are at goal and he will continue with pravastatin.  He has mild anemia which is stable.  His hemoglobin is 12.4.  He will continue with the daily iron supplement.  His blood pressure is controlled, and he will continue with his current blood pressure medication.    He is given meloxicam for treatment of the knee pain/patellar tendinitis.  He will let me know if this does not improve.    He is given Prevnar 13 today.      PHQ-2/PHQ-9 Depression Screening 5/17/2019   Little interest or pleasure in doing things 0   Feeling down, depressed, or hopeless 0   Trouble falling or staying asleep, or sleeping too much -   Feeling tired or having little energy -   Poor appetite or overeating -   Feeling bad about yourself - or that you are a failure or have let yourself or your family down -   Trouble concentrating on things, such as reading the newspaper or watching television -   Moving or speaking so slowly that other people could have noticed. Or the opposite -  being so fidgety or restless that you have been moving around a lot more than usual -   Thoughts that you would be better off dead, or of hurting yourself in some way -   Total Score 0   If you checked off any problems, how difficult have these problems made it for you to do your work, take care of things at home, or get along with other people? -         Lab on 05/06/2019   Component Date Value Ref Range Status   • Glucose 05/06/2019 124* 70 - 99 mg/dL Final   • BUN 05/06/2019 14  7 - 23 mg/dL Final   • Creatinine 05/06/2019 0.94  0.70 - 1.30 mg/dL Final   • Sodium 05/06/2019 142  137 - 145 mmol/L Final   • Potassium 05/06/2019 3.8  3.4 - 5.0 mmol/L Final   • Chloride 05/06/2019 105  101 - 112 mmol/L Final   • CO2 05/06/2019 26.0  22.0 - 30.0 mmol/L Final   • Calcium 05/06/2019 9.7  8.4 - 10.2 mg/dL Final   • Total Protein 05/06/2019 8.3  6.3 - 8.6 g/dL Final   • Albumin 05/06/2019 4.60  3.50 - 5.00 g/dL Final   • ALT (SGPT) 05/06/2019 26  <=50 U/L Final   • AST (SGOT) 05/06/2019 26  17 - 59 U/L Final   • Alkaline Phosphatase 05/06/2019 81  38 - 126 U/L Final   • Total Bilirubin 05/06/2019 0.7  0.2 - 1.3 mg/dL Final   • eGFR   Amer 05/06/2019 98  49 - 113 mL/min/1.73 Final   • Globulin 05/06/2019 3.7* 2.3 - 3.5 gm/dL Final   • A/G Ratio 05/06/2019 1.2  1.1 - 1.8 g/dL Final   • BUN/Creatinine Ratio 05/06/2019 14.9  7.0 - 25.0 Final   • Anion Gap 05/06/2019 11.0  5.0 - 15.0 mmol/L Final   • Hemoglobin A1C 05/06/2019 6.40* 4.80 - 5.60 % Final   • Total Cholesterol 05/06/2019 117* 150 - 200 mg/dL Final   • Triglycerides 05/06/2019 100  <=150 mg/dL Final   • HDL Cholesterol 05/06/2019 31* 40 - 59 mg/dL Final   • LDL Cholesterol  05/06/2019 66  <=100 mg/dL Final   • VLDL Cholesterol 05/06/2019 20  mg/dL Final   • LDL/HDL Ratio 05/06/2019 2.13  0.00 - 3.55 Final   • PSA 05/06/2019 1.050  0.000 - 4.000 ng/mL Final   • Free T4 05/06/2019 1.38  0.93 - 1.70 ng/dL Final   • TSH 05/06/2019 1.530  0.270 - 4.200 mIU/mL  Final   • Color, UA 05/06/2019 Yellow  Yellow, Straw Final   • Appearance, UA 05/06/2019 Clear  Clear Final   • pH, UA 05/06/2019 5.5  5.5 - 8.0 Final   • Specific Gravity, UA 05/06/2019 1.015  1.005 - 1.030 Final   • Glucose, UA 05/06/2019 Negative  Negative Final   • Ketones, UA 05/06/2019 Negative  Negative Final   • Bilirubin, UA 05/06/2019 Negative  Negative Final   • Blood, UA 05/06/2019 Negative  Negative Final   • Protein, UA 05/06/2019 Negative  Negative Final   • Leuk Esterase, UA 05/06/2019 Negative  Negative Final   • Nitrite, UA 05/06/2019 Negative  Negative Final   • Urobilinogen, UA 05/06/2019 0.2 E.U./dL  0.2 - 1.0 E.U./dL Final   • WBC 05/06/2019 8.13  3.40 - 10.80 10*3/mm3 Final   • RBC 05/06/2019 5.64  4.14 - 5.80 10*6/mm3 Final   • Hemoglobin 05/06/2019 12.4* 13.0 - 17.7 g/dL Final   • Hematocrit 05/06/2019 37.8  37.5 - 51.0 % Final   • MCV 05/06/2019 67.0* 79.0 - 97.0 fL Final   • MCH 05/06/2019 22.0* 26.6 - 33.0 pg Final   • MCHC 05/06/2019 32.8  31.5 - 35.7 g/dL Final   • RDW 05/06/2019 17.7* 12.3 - 15.4 % Final   • RDW-SD 05/06/2019 39.7  37.0 - 54.0 fl Final   • MPV 05/06/2019 0.0* 6.0 - 12.0 fL Final   • Platelets 05/06/2019 104* 140 - 450 10*3/mm3 Final   • Scan Slide 05/06/2019    Final    See Manual Differential Results   • Neutrophil % 05/06/2019 57.0  42.7 - 76.0 % Final   • Lymphocyte % 05/06/2019 34.0  19.6 - 45.3 % Final   • Monocyte % 05/06/2019 9.0  5.0 - 12.0 % Final   • Neutrophils Absolute 05/06/2019 4.63  1.70 - 7.00 10*3/mm3 Final   • Lymphocytes Absolute 05/06/2019 2.76  0.70 - 3.10 10*3/mm3 Final   • Monocytes Absolute 05/06/2019 0.73  0.10 - 0.90 10*3/mm3 Final   • Anisocytosis 05/06/2019 Slight/1+  None Seen Final   • Hypochromia 05/06/2019 Slight/1+  None Seen Final   • Microcytes 05/06/2019 Slight/1+  None Seen Final   • WBC Morphology 05/06/2019 Normal  Normal Final   • Platelet Estimate 05/06/2019 Decreased  Normal Final   • Large Platelets 05/06/2019 Slight/1+  None  Seen Final   ]

## 2019-05-17 NOTE — PATIENT INSTRUCTIONS

## 2019-05-23 DIAGNOSIS — I10 ESSENTIAL HYPERTENSION: ICD-10-CM

## 2019-05-23 RX ORDER — LISINOPRIL AND HYDROCHLOROTHIAZIDE 25; 20 MG/1; MG/1
TABLET ORAL
Qty: 30 TABLET | Refills: 0 | OUTPATIENT
Start: 2019-05-23

## 2019-05-23 RX ORDER — AMLODIPINE BESYLATE 5 MG/1
5 TABLET ORAL DAILY
Qty: 30 TABLET | Refills: 0 | OUTPATIENT
Start: 2019-05-23

## 2019-05-28 ENCOUNTER — TELEPHONE (OUTPATIENT)
Dept: FAMILY MEDICINE CLINIC | Facility: CLINIC | Age: 64
End: 2019-05-28

## 2019-05-28 DIAGNOSIS — I10 ESSENTIAL HYPERTENSION: Chronic | ICD-10-CM

## 2019-05-28 RX ORDER — LISINOPRIL AND HYDROCHLOROTHIAZIDE 25; 20 MG/1; MG/1
1 TABLET ORAL DAILY
Qty: 90 TABLET | Refills: 3 | Status: SHIPPED | OUTPATIENT
Start: 2019-05-28 | End: 2020-03-06 | Stop reason: SDUPTHER

## 2019-05-28 RX ORDER — AMLODIPINE BESYLATE 5 MG/1
5 TABLET ORAL DAILY
Qty: 90 TABLET | Refills: 3 | Status: SHIPPED | OUTPATIENT
Start: 2019-05-28 | End: 2020-03-06 | Stop reason: SDUPTHER

## 2019-06-11 ENCOUNTER — OFFICE VISIT (OUTPATIENT)
Dept: FAMILY MEDICINE CLINIC | Facility: CLINIC | Age: 64
End: 2019-06-11

## 2019-06-11 VITALS
TEMPERATURE: 98.2 F | HEART RATE: 74 BPM | WEIGHT: 226 LBS | DIASTOLIC BLOOD PRESSURE: 68 MMHG | SYSTOLIC BLOOD PRESSURE: 118 MMHG | HEIGHT: 72 IN | BODY MASS INDEX: 30.61 KG/M2

## 2019-06-11 DIAGNOSIS — J40 BRONCHITIS: Primary | ICD-10-CM

## 2019-06-11 DIAGNOSIS — J30.1 NON-SEASONAL ALLERGIC RHINITIS DUE TO POLLEN: ICD-10-CM

## 2019-06-11 PROCEDURE — 96372 THER/PROPH/DIAG INJ SC/IM: CPT | Performed by: INTERNAL MEDICINE

## 2019-06-11 PROCEDURE — 99213 OFFICE O/P EST LOW 20 MIN: CPT | Performed by: INTERNAL MEDICINE

## 2019-06-11 RX ORDER — METHYLPREDNISOLONE ACETATE 80 MG/ML
80 INJECTION, SUSPENSION INTRA-ARTICULAR; INTRALESIONAL; INTRAMUSCULAR; SOFT TISSUE ONCE
Status: COMPLETED | OUTPATIENT
Start: 2019-06-11 | End: 2019-06-11

## 2019-06-11 RX ORDER — AMOXICILLIN 500 MG/1
500 TABLET, FILM COATED ORAL 3 TIMES DAILY
Qty: 30 TABLET | Refills: 0 | Status: SHIPPED | OUTPATIENT
Start: 2019-06-11 | End: 2019-06-21

## 2019-06-11 RX ADMIN — METHYLPREDNISOLONE ACETATE 80 MG: 80 INJECTION, SUSPENSION INTRA-ARTICULAR; INTRALESIONAL; INTRAMUSCULAR; SOFT TISSUE at 09:25

## 2019-06-11 NOTE — PROGRESS NOTES
"Chief Complaint   Patient presents with   • Cough     c/o cough, congestion x 1 week     Subjective   Tao Cuadra is a 64 y.o. male who presents to the office complaining of sore throat, sneezing, nasal congestion which started last week.  He also complains of cough which is at times productive of a thick sputum.  He was working outside when his symptoms started.  He has been taking an over-the-counter cough and cold medication with no significant improvement.    The following portions of the patient's history were reviewed and updated as appropriate: allergies, current medications, past family history, past medical history, past social history, past surgical history and problem list.    Review of Systems   Constitutional: Negative for chills, fatigue and fever.   HENT: Positive for congestion, sneezing and sore throat. Negative for trouble swallowing.    Eyes: Negative for visual disturbance.   Respiratory: Positive for cough. Negative for chest tightness, shortness of breath and wheezing.    Cardiovascular: Negative for chest pain, palpitations and leg swelling.   Gastrointestinal: Negative for abdominal pain, constipation, diarrhea, nausea and vomiting.   Genitourinary: Negative for dysuria, frequency and urgency.   Musculoskeletal: Negative for neck pain.   Skin: Negative for rash.   Neurological: Negative for dizziness, weakness and headaches.   Psychiatric/Behavioral:        Patient denies any feelings of depression and has not felt down, hopeless or lost interest in any activities.   All other systems reviewed and are negative.      Objective   Vitals:    06/11/19 0907   BP: 118/68   BP Location: Left arm   Patient Position: Sitting   Cuff Size: Adult   Pulse: 74   Temp: 98.2 °F (36.8 °C)   TempSrc: Oral   Weight: 103 kg (226 lb)   Height: 181.6 cm (71.5\")   PainSc: 0-No pain     Physical Exam   Constitutional: He is oriented to person, place, and time. He appears well-developed and well-nourished. No " distress.   HENT:   Head: Normocephalic and atraumatic.   Nose: Nose normal.   Mouth/Throat: Posterior oropharyngeal erythema present. No oropharyngeal exudate.   Nose is congested.  Oropharynx erythematous with posterior drainage.   Eyes: Conjunctivae and EOM are normal. Pupils are equal, round, and reactive to light. No scleral icterus.   Neck: Normal range of motion. Neck supple.   Cardiovascular: Normal rate, regular rhythm and normal heart sounds. Exam reveals no gallop and no friction rub.   No murmur heard.  Pulmonary/Chest: Effort normal and breath sounds normal. No respiratory distress. He has no wheezes. He has no rales.   Abdominal: Soft. Bowel sounds are normal. He exhibits no distension. There is no tenderness. There is no rebound and no guarding.   Musculoskeletal: Normal range of motion. He exhibits no edema.   Lymphadenopathy:     He has no cervical adenopathy.   Neurological: He is alert and oriented to person, place, and time. No cranial nerve deficit.   Skin: Skin is warm and dry. No rash noted.   Psychiatric: He has a normal mood and affect. His behavior is normal. Judgment and thought content normal.   Nursing note and vitals reviewed.      Assessment/Plan   Tao was seen today for cough.    Diagnoses and all orders for this visit:    Bronchitis  -     methylPREDNISolone acetate (DEPO-medrol) injection 80 mg  -     amoxicillin (AMOXIL) 500 MG tablet; Take 1 tablet by mouth 3 (Three) Times a Day for 10 days.    Non-seasonal allergic rhinitis due to pollen  -     methylPREDNISolone acetate (DEPO-medrol) injection 80 mg    He is given amoxicillin for treatment of the bronchitis.  He is given Depo-Medrol 80 mg IM to help with the allergy symptoms and cough.  He may use Mucinex over-the-counter to help with congestion.         PHQ-2/PHQ-9 Depression Screening 6/11/2019   Little interest or pleasure in doing things 0   Feeling down, depressed, or hopeless 0   Trouble falling or staying asleep, or  sleeping too much -   Feeling tired or having little energy -   Poor appetite or overeating -   Feeling bad about yourself - or that you are a failure or have let yourself or your family down -   Trouble concentrating on things, such as reading the newspaper or watching television -   Moving or speaking so slowly that other people could have noticed. Or the opposite - being so fidgety or restless that you have been moving around a lot more than usual -   Thoughts that you would be better off dead, or of hurting yourself in some way -   Total Score 0   If you checked off any problems, how difficult have these problems made it for you to do your work, take care of things at home, or get along with other people? -

## 2019-06-13 DIAGNOSIS — E11.9 TYPE 2 DIABETES MELLITUS WITHOUT COMPLICATION, WITHOUT LONG-TERM CURRENT USE OF INSULIN (HCC): Chronic | ICD-10-CM

## 2019-06-13 DIAGNOSIS — E78.2 MIXED HYPERLIPIDEMIA: Chronic | ICD-10-CM

## 2019-06-13 RX ORDER — METFORMIN HYDROCHLORIDE 500 MG/1
1000 TABLET, EXTENDED RELEASE ORAL 2 TIMES DAILY
Qty: 360 TABLET | Refills: 0 | Status: SHIPPED | OUTPATIENT
Start: 2019-06-13 | End: 2019-12-19

## 2019-06-13 RX ORDER — PRAVASTATIN SODIUM 80 MG/1
80 TABLET ORAL DAILY
Qty: 90 TABLET | Refills: 0 | Status: SHIPPED | OUTPATIENT
Start: 2019-06-13 | End: 2020-01-07

## 2019-06-13 NOTE — TELEPHONE ENCOUNTER
Coreen Anderson Quin'Dee Marie, LPN         Needs refill on  metFORMIN ER (GLUCOPHAGE-XR) 500 MG, and also needs refill on pravastatin (PRAVACHOL) 80 MG, to [facility]. The Pharmacy called and said that the patient was wanting these medications filled there.

## 2019-11-22 DIAGNOSIS — D50.8 IRON DEFICIENCY ANEMIA SECONDARY TO INADEQUATE DIETARY IRON INTAKE: Chronic | ICD-10-CM

## 2019-11-22 RX ORDER — IRON POLYSACCHARIDE COMPLEX 150 MG
CAPSULE ORAL
Qty: 30 CAPSULE | Refills: 0 | Status: SHIPPED | OUTPATIENT
Start: 2019-11-22 | End: 2019-12-23

## 2019-12-16 NOTE — TELEPHONE ENCOUNTER
Last office visit 5-17-19. Next office visit was due around 11-17-19. This has not been scheduled.

## 2019-12-18 DIAGNOSIS — E11.9 TYPE 2 DIABETES MELLITUS WITHOUT COMPLICATION, WITHOUT LONG-TERM CURRENT USE OF INSULIN (HCC): Chronic | ICD-10-CM

## 2019-12-19 RX ORDER — METFORMIN HYDROCHLORIDE 500 MG/1
TABLET, EXTENDED RELEASE ORAL
Qty: 120 TABLET | Refills: 0 | Status: SHIPPED | OUTPATIENT
Start: 2019-12-19 | End: 2020-01-17

## 2019-12-23 RX ORDER — IRON POLYSACCHARIDE COMPLEX 150 MG
CAPSULE ORAL
Qty: 30 CAPSULE | Refills: 3 | Status: SHIPPED | OUTPATIENT
Start: 2019-12-23 | End: 2020-03-06 | Stop reason: SDUPTHER

## 2020-01-07 DIAGNOSIS — E78.2 MIXED HYPERLIPIDEMIA: Chronic | ICD-10-CM

## 2020-01-07 RX ORDER — PRAVASTATIN SODIUM 80 MG/1
TABLET ORAL
Qty: 30 TABLET | Refills: 0 | Status: SHIPPED | OUTPATIENT
Start: 2020-01-07 | End: 2020-02-06

## 2020-01-17 DIAGNOSIS — E11.9 TYPE 2 DIABETES MELLITUS WITHOUT COMPLICATION, WITHOUT LONG-TERM CURRENT USE OF INSULIN (HCC): Chronic | ICD-10-CM

## 2020-01-17 RX ORDER — METFORMIN HYDROCHLORIDE 500 MG/1
TABLET, EXTENDED RELEASE ORAL
Qty: 120 TABLET | Refills: 0 | Status: SHIPPED | OUTPATIENT
Start: 2020-01-17 | End: 2020-02-27

## 2020-02-06 DIAGNOSIS — E78.2 MIXED HYPERLIPIDEMIA: Chronic | ICD-10-CM

## 2020-02-06 RX ORDER — PRAVASTATIN SODIUM 80 MG/1
TABLET ORAL
Qty: 30 TABLET | Refills: 0 | Status: SHIPPED | OUTPATIENT
Start: 2020-02-06 | End: 2020-02-18

## 2020-02-17 DIAGNOSIS — E11.9 TYPE 2 DIABETES MELLITUS WITHOUT COMPLICATION, WITHOUT LONG-TERM CURRENT USE OF INSULIN (HCC): Chronic | ICD-10-CM

## 2020-02-17 RX ORDER — METFORMIN HYDROCHLORIDE 500 MG/1
TABLET, EXTENDED RELEASE ORAL
Qty: 60 TABLET | Refills: 0 | OUTPATIENT
Start: 2020-02-17

## 2020-02-18 DIAGNOSIS — E78.2 MIXED HYPERLIPIDEMIA: Chronic | ICD-10-CM

## 2020-02-18 DIAGNOSIS — E11.9 TYPE 2 DIABETES MELLITUS WITHOUT COMPLICATION, WITHOUT LONG-TERM CURRENT USE OF INSULIN (HCC): Chronic | ICD-10-CM

## 2020-02-18 RX ORDER — PRAVASTATIN SODIUM 80 MG/1
TABLET ORAL
Qty: 30 TABLET | Refills: 0 | Status: SHIPPED | OUTPATIENT
Start: 2020-02-18 | End: 2020-03-06 | Stop reason: SDUPTHER

## 2020-02-20 RX ORDER — METFORMIN HYDROCHLORIDE 500 MG/1
TABLET, EXTENDED RELEASE ORAL
Qty: 120 TABLET | Refills: 0 | OUTPATIENT
Start: 2020-02-20

## 2020-02-26 DIAGNOSIS — E11.9 TYPE 2 DIABETES MELLITUS WITHOUT COMPLICATION, WITHOUT LONG-TERM CURRENT USE OF INSULIN (HCC): Chronic | ICD-10-CM

## 2020-02-27 RX ORDER — METFORMIN HYDROCHLORIDE 500 MG/1
TABLET, EXTENDED RELEASE ORAL
Qty: 32 TABLET | Refills: 0 | Status: SHIPPED | OUTPATIENT
Start: 2020-02-27 | End: 2020-03-06 | Stop reason: SDUPTHER

## 2020-03-02 ENCOUNTER — LAB (OUTPATIENT)
Dept: LAB | Facility: OTHER | Age: 65
End: 2020-03-02

## 2020-03-02 DIAGNOSIS — I10 ESSENTIAL HYPERTENSION: Chronic | ICD-10-CM

## 2020-03-02 DIAGNOSIS — E11.9 TYPE 2 DIABETES MELLITUS WITHOUT COMPLICATION, WITHOUT LONG-TERM CURRENT USE OF INSULIN (HCC): Chronic | ICD-10-CM

## 2020-03-02 DIAGNOSIS — E78.2 MIXED HYPERLIPIDEMIA: Chronic | ICD-10-CM

## 2020-03-02 DIAGNOSIS — D50.8 IRON DEFICIENCY ANEMIA SECONDARY TO INADEQUATE DIETARY IRON INTAKE: ICD-10-CM

## 2020-03-02 LAB
ALBUMIN SERPL-MCNC: 4.7 G/DL (ref 3.5–5)
ALBUMIN UR-MCNC: <1.2 MG/DL
ALBUMIN/GLOB SERPL: 1.2 G/DL (ref 1.1–1.8)
ALP SERPL-CCNC: 66 U/L (ref 38–126)
ALT SERPL W P-5'-P-CCNC: 28 U/L
ANION GAP SERPL CALCULATED.3IONS-SCNC: 15 MMOL/L (ref 5–15)
AST SERPL-CCNC: 31 U/L (ref 17–59)
BILIRUB SERPL-MCNC: 1 MG/DL (ref 0.2–1.3)
BILIRUB UR QL STRIP: NEGATIVE
BUN BLD-MCNC: 10 MG/DL (ref 7–23)
BUN/CREAT SERPL: 10.4 (ref 7–25)
CALCIUM SPEC-SCNC: 10 MG/DL (ref 8.4–10.2)
CHLORIDE SERPL-SCNC: 104 MMOL/L (ref 101–112)
CHOLEST SERPL-MCNC: 145 MG/DL (ref 150–200)
CLARITY UR: CLEAR
CO2 SERPL-SCNC: 26 MMOL/L (ref 22–30)
COLOR UR: YELLOW
CREAT BLD-MCNC: 0.96 MG/DL (ref 0.7–1.3)
CREAT UR-MCNC: 68.7 MG/DL
DEPRECATED RDW RBC AUTO: 40.1 FL (ref 37–54)
EOSINOPHIL # BLD MANUAL: 0.09 10*3/MM3 (ref 0–0.4)
EOSINOPHIL NFR BLD MANUAL: 1 % (ref 0.3–6.2)
ERYTHROCYTE [DISTWIDTH] IN BLOOD BY AUTOMATED COUNT: 17.5 % (ref 12.3–15.4)
GFR SERPL CREATININE-BSD FRML MDRD: 95 ML/MIN/1.73 (ref 49–113)
GLOBULIN UR ELPH-MCNC: 3.8 GM/DL (ref 2.3–3.5)
GLUCOSE BLD-MCNC: 116 MG/DL (ref 70–99)
GLUCOSE UR STRIP-MCNC: NEGATIVE MG/DL
HBA1C MFR BLD: 6.97 % (ref 4.8–5.6)
HCT VFR BLD AUTO: 37.1 % (ref 37.5–51)
HDLC SERPL-MCNC: 31 MG/DL (ref 40–59)
HGB BLD-MCNC: 12.1 G/DL (ref 13–17.7)
HGB UR QL STRIP.AUTO: NEGATIVE
HYPOCHROMIA BLD QL: ABNORMAL
KETONES UR QL STRIP: NEGATIVE
LARGE PLATELETS: ABNORMAL
LDLC SERPL CALC-MCNC: 79 MG/DL
LDLC/HDLC SERPL: 2.55 {RATIO} (ref 0–3.55)
LEUKOCYTE ESTERASE UR QL STRIP.AUTO: NEGATIVE
LYMPHOCYTES # BLD MANUAL: 4.31 10*3/MM3 (ref 0.7–3.1)
LYMPHOCYTES NFR BLD MANUAL: 48 % (ref 19.6–45.3)
LYMPHOCYTES NFR BLD MANUAL: 6 % (ref 5–12)
MCH RBC QN AUTO: 21.2 PG (ref 26.6–33)
MCHC RBC AUTO-ENTMCNC: 32.6 G/DL (ref 31.5–35.7)
MCV RBC AUTO: 65 FL (ref 79–97)
MICROALBUMIN/CREAT UR: NORMAL MG/G{CREAT}
MICROCYTES BLD QL: ABNORMAL
MONOCYTES # BLD AUTO: 0.54 10*3/MM3 (ref 0.1–0.9)
NEUTROPHILS # BLD AUTO: 4.04 10*3/MM3 (ref 1.7–7)
NEUTROPHILS NFR BLD MANUAL: 45 % (ref 42.7–76)
NITRITE UR QL STRIP: NEGATIVE
PH UR STRIP.AUTO: 5.5 [PH] (ref 5.5–8)
PLATELET # BLD AUTO: 121 10*3/MM3 (ref 140–450)
PMV BLD AUTO: 0 FL (ref 6–12)
POTASSIUM BLD-SCNC: 3.7 MMOL/L (ref 3.4–5)
PROT SERPL-MCNC: 8.5 G/DL (ref 6.3–8.6)
PROT UR QL STRIP: NEGATIVE
RBC # BLD AUTO: 5.71 10*6/MM3 (ref 4.14–5.8)
SCAN SLIDE: NORMAL
SMALL PLATELETS BLD QL SMEAR: ABNORMAL
SODIUM BLD-SCNC: 145 MMOL/L (ref 137–145)
SP GR UR STRIP: 1.02 (ref 1–1.03)
T4 FREE SERPL-MCNC: 1.38 NG/DL (ref 0.93–1.7)
TRIGL SERPL-MCNC: 175 MG/DL
TSH SERPL DL<=0.05 MIU/L-ACNC: 1.96 UIU/ML (ref 0.27–4.2)
UROBILINOGEN UR QL STRIP: NORMAL
VLDLC SERPL-MCNC: 35 MG/DL
WBC MORPH BLD: NORMAL
WBC NRBC COR # BLD: 8.98 10*3/MM3 (ref 3.4–10.8)

## 2020-03-02 PROCEDURE — 36415 COLL VENOUS BLD VENIPUNCTURE: CPT | Performed by: INTERNAL MEDICINE

## 2020-03-02 PROCEDURE — 81003 URINALYSIS AUTO W/O SCOPE: CPT | Performed by: INTERNAL MEDICINE

## 2020-03-02 PROCEDURE — 84443 ASSAY THYROID STIM HORMONE: CPT | Performed by: INTERNAL MEDICINE

## 2020-03-02 PROCEDURE — 85025 COMPLETE CBC W/AUTO DIFF WBC: CPT | Performed by: INTERNAL MEDICINE

## 2020-03-02 PROCEDURE — 84439 ASSAY OF FREE THYROXINE: CPT | Performed by: INTERNAL MEDICINE

## 2020-03-02 PROCEDURE — 80053 COMPREHEN METABOLIC PANEL: CPT | Performed by: INTERNAL MEDICINE

## 2020-03-02 PROCEDURE — 83036 HEMOGLOBIN GLYCOSYLATED A1C: CPT | Performed by: INTERNAL MEDICINE

## 2020-03-02 PROCEDURE — 80061 LIPID PANEL: CPT | Performed by: INTERNAL MEDICINE

## 2020-03-02 PROCEDURE — 82043 UR ALBUMIN QUANTITATIVE: CPT | Performed by: INTERNAL MEDICINE

## 2020-03-02 PROCEDURE — 82570 ASSAY OF URINE CREATININE: CPT | Performed by: INTERNAL MEDICINE

## 2020-03-06 ENCOUNTER — OFFICE VISIT (OUTPATIENT)
Dept: FAMILY MEDICINE CLINIC | Facility: CLINIC | Age: 65
End: 2020-03-06

## 2020-03-06 VITALS
HEART RATE: 68 BPM | BODY MASS INDEX: 31.42 KG/M2 | DIASTOLIC BLOOD PRESSURE: 74 MMHG | SYSTOLIC BLOOD PRESSURE: 124 MMHG | HEIGHT: 72 IN | WEIGHT: 232 LBS | TEMPERATURE: 98.6 F | OXYGEN SATURATION: 99 %

## 2020-03-06 DIAGNOSIS — Z12.5 SCREENING FOR PROSTATE CANCER: ICD-10-CM

## 2020-03-06 DIAGNOSIS — M17.0 PRIMARY OSTEOARTHRITIS OF BOTH KNEES: Chronic | ICD-10-CM

## 2020-03-06 DIAGNOSIS — E11.9 TYPE 2 DIABETES MELLITUS WITHOUT COMPLICATION, WITHOUT LONG-TERM CURRENT USE OF INSULIN (HCC): Chronic | ICD-10-CM

## 2020-03-06 DIAGNOSIS — E78.2 MIXED HYPERLIPIDEMIA: Chronic | ICD-10-CM

## 2020-03-06 DIAGNOSIS — D50.8 IRON DEFICIENCY ANEMIA SECONDARY TO INADEQUATE DIETARY IRON INTAKE: Chronic | ICD-10-CM

## 2020-03-06 DIAGNOSIS — I10 ESSENTIAL HYPERTENSION: Chronic | ICD-10-CM

## 2020-03-06 DIAGNOSIS — Z00.00 INITIAL MEDICARE ANNUAL WELLNESS VISIT: Primary | ICD-10-CM

## 2020-03-06 PROCEDURE — G0438 PPPS, INITIAL VISIT: HCPCS | Performed by: INTERNAL MEDICINE

## 2020-03-06 RX ORDER — METFORMIN HYDROCHLORIDE 500 MG/1
1000 TABLET, EXTENDED RELEASE ORAL 2 TIMES DAILY
Qty: 360 TABLET | Refills: 3 | Status: SHIPPED | OUTPATIENT
Start: 2020-03-06 | End: 2021-03-09

## 2020-03-06 RX ORDER — IRON POLYSACCHARIDE COMPLEX 150 MG
150 CAPSULE ORAL DAILY
Qty: 90 CAPSULE | Refills: 3 | Status: SHIPPED | OUTPATIENT
Start: 2020-03-06 | End: 2021-03-22 | Stop reason: SDUPTHER

## 2020-03-06 RX ORDER — AMLODIPINE BESYLATE 5 MG/1
5 TABLET ORAL DAILY
Qty: 90 TABLET | Refills: 3 | Status: SHIPPED | OUTPATIENT
Start: 2020-03-06 | End: 2021-02-24

## 2020-03-06 RX ORDER — LISINOPRIL AND HYDROCHLOROTHIAZIDE 25; 20 MG/1; MG/1
1 TABLET ORAL DAILY
Qty: 90 TABLET | Refills: 3 | Status: SHIPPED | OUTPATIENT
Start: 2020-03-06 | End: 2021-02-24

## 2020-03-06 RX ORDER — PRAVASTATIN SODIUM 80 MG/1
80 TABLET ORAL DAILY
Qty: 90 TABLET | Refills: 3 | Status: SHIPPED | OUTPATIENT
Start: 2020-03-06 | End: 2021-03-25 | Stop reason: SDUPTHER

## 2020-03-06 NOTE — PATIENT INSTRUCTIONS

## 2020-03-06 NOTE — PROGRESS NOTES
The ABCs of the Annual Wellness Visit  Initial Medicare Wellness Visit    Chief Complaint   Patient presents with   • Medicare Wellness-Initial Visit     lab f/u    • Hyperlipidemia   • Hypertension       Subjective   History of Present Illness:  Tao Cuadra is a 64 y.o. male who presents for an Initial Medicare Wellness Visit.    HEALTH RISK ASSESSMENT    Recent Hospitalizations:  No hospitalization(s) within the last year.    Current Medical Providers:  Patient Care Team:  Juan Hodges MD as PCP - General (Family Medicine)  Wai Faust MD as Surgeon (Orthopedic Surgery)  Rudi Treadwell MD as Consulting Physician (Gastroenterology)    Smoking Status:  Social History     Tobacco Use   Smoking Status Former Smoker   • Last attempt to quit:    • Years since quittin.1   Smokeless Tobacco Never Used       Alcohol Consumption:  Social History     Substance and Sexual Activity   Alcohol Use No       Depression Screen:   PHQ-2/PHQ-9 Depression Screening 3/6/2020   Little interest or pleasure in doing things 0   Feeling down, depressed, or hopeless 0   Trouble falling or staying asleep, or sleeping too much -   Feeling tired or having little energy -   Poor appetite or overeating -   Feeling bad about yourself - or that you are a failure or have let yourself or your family down -   Trouble concentrating on things, such as reading the newspaper or watching television -   Moving or speaking so slowly that other people could have noticed. Or the opposite - being so fidgety or restless that you have been moving around a lot more than usual -   Thoughts that you would be better off dead, or of hurting yourself in some way -   Total Score 0   If you checked off any problems, how difficult have these problems made it for you to do your work, take care of things at home, or get along with other people? -       Fall Risk Screen:  STEADI Fall Risk Assessment was completed, and patient is at LOW risk for  falls.Assessment completed on:3/6/2020    Health Habits and Functional and Cognitive Screening:  Functional & Cognitive Status 3/6/2020   Do you have difficulty preparing food and eating? No   Do you have difficulty bathing yourself, getting dressed or grooming yourself? Yes   Do you have difficulty using the toilet? No   Do you have difficulty moving around from place to place? No   Do you have trouble with steps or getting out of a bed or a chair? No   Current Diet Diabetic Diet   Dental Exam Up to date   Eye Exam Up to date   Exercise (times per week) 7 times per week   Current Exercise Activities Include Housecleaning   Do you need help using the phone?  No   Are you deaf or do you have serious difficulty hearing?  No   Do you need help with transportation? No   Do you need help shopping? No   Do you need help preparing meals?  No   Do you need help with housework?  No   Do you need help with laundry? No   Do you need help taking your medications? No   Do you need help managing money? No   Do you ever drive or ride in a car without wearing a seat belt? No   Have you felt unusual stress, anger or loneliness in the last month? No   Who do you live with? Spouse   If you need help, do you have trouble finding someone available to you? No   Have you been bothered in the last four weeks by sexual problems? No   Do you have difficulty concentrating, remembering or making decisions? Yes         Does the patient have evidence of cognitive impairment? No    Asprin use counseling:Does not take aspirin    Age-appropriate Screening Schedule:  Refer to the list below for future screening recommendations based on patient's age, sex and/or medical conditions. Orders for these recommended tests are listed in the plan section. The patient has been provided with a written plan.    Health Maintenance   Topic Date Due   • TDAP/TD VACCINES (2 - Td) 04/28/2017   • ZOSTER VACCINE (2 of 2) 06/23/2017   • DIABETIC FOOT EXAM  04/30/2019    • INFLUENZA VACCINE  08/01/2019   • HEMOGLOBIN A1C  09/02/2020   • DIABETIC EYE EXAM  11/19/2020   • LIPID PANEL  03/02/2021   • URINE MICROALBUMIN  03/02/2021   • COLONOSCOPY  08/26/2029          The following portions of the patient's history were reviewed and updated as appropriate: allergies, current medications, past family history, past medical history, past social history, past surgical history and problem list.    Outpatient Medications Prior to Visit   Medication Sig Dispense Refill   • cetirizine (zyrTEC) 10 MG tablet Take 10 mg by mouth Daily.     • glucose blood (ONE TOUCH ULTRA TEST) test strip 1 each by Other route Daily. To check glucose 50 each 0   • Lancets 30G misc Check blood sugar every morning     • meloxicam (MOBIC) 15 MG tablet Take 1 tablet by mouth Daily. 30 tablet 1   • amLODIPine (NORVASC) 5 MG tablet Take 1 tablet by mouth Daily. 90 tablet 3   • FERREX 150 150 MG capsule TAKE 1 CAPSULE BY MOUTH ONCE DAILY 30 capsule 3   • lisinopril-hydrochlorothiazide (PRINZIDE,ZESTORETIC) 20-25 MG per tablet Take 1 tablet by mouth Daily. 90 tablet 3   • metFORMIN ER (GLUCOPHAGE-XR) 500 MG 24 hr tablet TAKE 2 TABLETS BY MOUTH TWICE DAILY [MUST MAKE APPOINTMENT WITH PRESCRIBER FOR FURTHER REFILLS] 32 tablet 0   • pravastatin (PRAVACHOL) 80 MG tablet TAKE 1 TABLET BY MOUTH EVERY DAY 30 tablet 0     No facility-administered medications prior to visit.        Patient Active Problem List   Diagnosis   • Essential hypertension   • Mixed hyperlipidemia   • Type 2 diabetes mellitus without complication, without long-term current use of insulin (CMS/[facility])   • Iron deficiency anemia secondary to inadequate dietary iron intake   • Fracture, toe   • Partial traumatic amputation of great toe (CMS/HCC)   • Primary osteoarthritis of both knees       Advanced Care Planning:  ACP discussion was held with the patient during this visit. Patient does not have an advance directive, declines further assistance.    Review of  "Systems    Compared to one year ago, the patient feels his physical health is the same.  Compared to one year ago, the patient feels his mental health is the same.    Reviewed chart for potential of high risk medication in the elderly: yes  Reviewed chart for potential of harmful drug interactions in the elderly:yes    Objective         Vitals:    03/06/20 1026   BP: 124/74   Pulse: 68   Temp: 98.6 °F (37 °C)   TempSrc: Oral   SpO2: 99%   Weight: 105 kg (232 lb)   Height: 181.6 cm (71.5\")   PainSc: 0-No pain       Body mass index is 31.91 kg/m².  Discussed the patient's BMI with him. The BMI is above average; BMI management plan is completed.    Physical Exam    Lab Results   Component Value Date    TRIG 175 (H) 03/02/2020    HDL 31 (L) 03/02/2020    LDL 79 03/02/2020    VLDL 35 03/02/2020    HGBA1C 6.97 (H) 03/02/2020        Assessment/Plan   Medicare Risks and Personalized Health Plan  CMS Preventative Services Quick Reference  Fall Risk  Immunizations Discussed/Encouraged (specific immunizations; Influenza, Pneumococcal 23 and Shingrix )  Obesity/Overweight   Prostate Cancer Screening     The above risks/problems have been discussed with the patient.  Pertinent information has been shared with the patient in the After Visit Summary.  Follow up plans and orders are seen below in the Assessment/Plan Section.    Diagnoses and all orders for this visit:    1. Initial Medicare annual wellness visit (Primary)    2. Type 2 diabetes mellitus without complication, without long-term current use of insulin (CMS/[facility])  -     Hemoglobin A1c; Future  -     metFORMIN ER (GLUCOPHAGE-XR) 500 MG 24 hr tablet; Take 2 tablets by mouth 2 (Two) Times a Day.  Dispense: 360 tablet; Refill: 3    3. Essential hypertension  -     Comprehensive Metabolic Panel; Future  -     T4, Free; Future  -     TSH; Future  -     Urinalysis With Culture If Indicated -; Future  -     amLODIPine (NORVASC) 5 MG tablet; Take 1 tablet by mouth Daily.  " Dispense: 90 tablet; Refill: 3  -     lisinopril-hydrochlorothiazide (PRINZIDE,ZESTORETIC) 20-25 MG per tablet; Take 1 tablet by mouth Daily.  Dispense: 90 tablet; Refill: 3    4. Mixed hyperlipidemia  -     LDL Cholesterol, Direct; Future  -     Triglycerides; Future  -     pravastatin (PRAVACHOL) 80 MG tablet; Take 1 tablet by mouth Daily.  Dispense: 90 tablet; Refill: 3    5. Iron deficiency anemia secondary to inadequate dietary iron intake  -     CBC & Differential; Future  -     iron polysaccharides (FERREX 150) 150 MG capsule; Take 1 capsule by mouth Daily.  Dispense: 90 capsule; Refill: 3    6. Primary osteoarthritis of both knees    7. Screening for prostate cancer  -     PSA Screen; Future      Follow Up:  Return in about 6 months (around 9/6/2020) for Next scheduled follow up, Or sooner as needed With Labs prior to appointment.     An After Visit Summary and PPPS were given to the patient.

## 2020-03-06 NOTE — PROGRESS NOTES
"Chief Complaint   Patient presents with   • Medicare Wellness-Initial Visit     lab f/u    • Hyperlipidemia   • Hypertension     Subjective   Tao Cuadra is a 64 y.o. male who presents to the office for follow-up and review of labs.  He has diabetes and his blood sugar has been controlled.  He takes metformin for treatment of his diabetes.  He has hypertension and his blood pressure has been doing well.  He takes lisinopril HCT and amlodipine.  He has hyperlipidemia and takes pravastatin daily.  He has iron deficiency anemia and takes a daily iron supplement.  He has osteoarthritis which occasionally flares up and affects his knees.  He takes meloxicam as needed.    The following portions of the patient's history were reviewed and updated as appropriate: allergies, current medications, past family history, past medical history, past social history, past surgical history and problem list.    Review of Systems   Constitutional: Negative for chills, fatigue and fever.   HENT: Negative for congestion, sneezing, sore throat and trouble swallowing.    Eyes: Negative for visual disturbance.   Respiratory: Negative for cough, chest tightness, shortness of breath and wheezing.    Cardiovascular: Negative for chest pain, palpitations and leg swelling.   Gastrointestinal: Negative for abdominal pain, constipation, diarrhea, nausea and vomiting.   Genitourinary: Negative for dysuria, frequency and urgency.   Musculoskeletal: Negative for neck pain.   Skin: Negative for rash.   Neurological: Negative for dizziness, weakness and headaches.   Psychiatric/Behavioral:        Patient denies any feelings of depression and has not felt down, hopeless or lost interest in any activities.   All other systems reviewed and are negative.      Objective   Vitals:    03/06/20 1026   BP: 124/74   Pulse: 68   Temp: 98.6 °F (37 °C)   TempSrc: Oral   SpO2: 99%   Weight: 105 kg (232 lb)   Height: 181.6 cm (71.5\")   PainSc: 0-No pain      Body " mass index is 31.91 kg/m².    Physical Exam   Constitutional: He is oriented to person, place, and time. He appears well-developed and well-nourished. No distress.   HENT:   Head: Normocephalic and atraumatic.   Nose: Nose normal.   Mouth/Throat: Oropharynx is clear and moist. No oropharyngeal exudate.   Eyes: Pupils are equal, round, and reactive to light. Conjunctivae and EOM are normal. No scleral icterus.   Neck: Normal range of motion. Neck supple.   Cardiovascular: Normal rate, regular rhythm and normal heart sounds. Exam reveals no gallop and no friction rub.   No murmur heard.  Pulmonary/Chest: Effort normal and breath sounds normal. No respiratory distress. He has no wheezes. He has no rales.   Abdominal: Soft. Bowel sounds are normal. He exhibits no distension. There is no tenderness. There is no rebound and no guarding.   Musculoskeletal: Normal range of motion. He exhibits no edema.   Lymphadenopathy:     He has no cervical adenopathy.   Neurological: He is alert and oriented to person, place, and time. No cranial nerve deficit.   Skin: Skin is warm and dry. No rash noted.   Psychiatric: He has a normal mood and affect. His behavior is normal. Judgment and thought content normal.   Nursing note and vitals reviewed.      Assessment/Plan   Tao was seen today for medicare wellness-initial visit, hyperlipidemia and hypertension.    Diagnoses and all orders for this visit:    Initial Medicare annual wellness visit    Type 2 diabetes mellitus without complication, without long-term current use of insulin (CMS/[facility])  -     Hemoglobin A1c; Future  -     metFORMIN ER (GLUCOPHAGE-XR) 500 MG 24 hr tablet; Take 2 tablets by mouth 2 (Two) Times a Day.    Essential hypertension  -     Comprehensive Metabolic Panel; Future  -     T4, Free; Future  -     TSH; Future  -     Urinalysis With Culture If Indicated -; Future  -     amLODIPine (NORVASC) 5 MG tablet; Take 1 tablet by mouth Daily.  -      lisinopril-hydrochlorothiazide (PRINZIDE,ZESTORETIC) 20-25 MG per tablet; Take 1 tablet by mouth Daily.    Mixed hyperlipidemia  -     LDL Cholesterol, Direct; Future  -     Triglycerides; Future  -     pravastatin (PRAVACHOL) 80 MG tablet; Take 1 tablet by mouth Daily.    Iron deficiency anemia secondary to inadequate dietary iron intake  -     CBC & Differential; Future  -     iron polysaccharides (FERREX 150) 150 MG capsule; Take 1 capsule by mouth Daily.    Primary osteoarthritis of both knees    Screening for prostate cancer  -     PSA Screen; Future         Labs are reviewed with patient.  His glucose is 116 with hemoglobin A1c of 6.97. His diabetes is controlled.  He will continue with metformin.  He may monitor blood sugar one time daily. His triglycerides are a little elevated at 175.  The remainder of his lipids are at goal and he will continue with pravastatin.  He has mild anemia which is stable.  His hemoglobin is 12.1, and hematocrit is 37.1.  He will continue with the daily iron supplement.  His blood pressure is controlled, and he will continue with his current blood pressure medication.  He may continue with meloxicam daily as needed for flareups in the arthritic pain in his knees.      Patient's Body mass index is 31.91 kg/m². BMI is above normal parameters. Recommendations include: educational material.    PHQ-2/PHQ-9 Depression Screening 3/6/2020   Little interest or pleasure in doing things 0   Feeling down, depressed, or hopeless 0   Trouble falling or staying asleep, or sleeping too much -   Feeling tired or having little energy -   Poor appetite or overeating -   Feeling bad about yourself - or that you are a failure or have let yourself or your family down -   Trouble concentrating on things, such as reading the newspaper or watching television -   Moving or speaking so slowly that other people could have noticed. Or the opposite - being so fidgety or restless that you have been moving  around a lot more than usual -   Thoughts that you would be better off dead, or of hurting yourself in some way -   Total Score 0   If you checked off any problems, how difficult have these problems made it for you to do your work, take care of things at home, or get along with other people? -         Lab on 03/02/2020   Component Date Value Ref Range Status   • Glucose 03/02/2020 116* 70 - 99 mg/dL Final   • BUN 03/02/2020 10  7 - 23 mg/dL Final   • Creatinine 03/02/2020 0.96  0.70 - 1.30 mg/dL Final   • Sodium 03/02/2020 145  137 - 145 mmol/L Final   • Potassium 03/02/2020 3.7  3.4 - 5.0 mmol/L Final   • Chloride 03/02/2020 104  101 - 112 mmol/L Final   • CO2 03/02/2020 26.0  22.0 - 30.0 mmol/L Final   • Calcium 03/02/2020 10.0  8.4 - 10.2 mg/dL Final   • Total Protein 03/02/2020 8.5  6.3 - 8.6 g/dL Final   • Albumin 03/02/2020 4.70  3.50 - 5.00 g/dL Final   • ALT (SGPT) 03/02/2020 28  <=50 U/L Final   • AST (SGOT) 03/02/2020 31  17 - 59 U/L Final   • Alkaline Phosphatase 03/02/2020 66  38 - 126 U/L Final   • Total Bilirubin 03/02/2020 1.0  0.2 - 1.3 mg/dL Final   • eGFR  African Amer 03/02/2020 95  49 - 113 mL/min/1.73 Final   • Globulin 03/02/2020 3.8* 2.3 - 3.5 gm/dL Final   • A/G Ratio 03/02/2020 1.2  1.1 - 1.8 g/dL Final   • BUN/Creatinine Ratio 03/02/2020 10.4  7.0 - 25.0 Final   • Anion Gap 03/02/2020 15.0  5.0 - 15.0 mmol/L Final   • Free T4 03/02/2020 1.38  0.93 - 1.70 ng/dL Final   • TSH 03/02/2020 1.960  0.270 - 4.200 uIU/mL Final   • Color, UA 03/02/2020 Yellow  Yellow, Straw Final   • Appearance, UA 03/02/2020 Clear  Clear Final   • pH, UA 03/02/2020 5.5  5.5 - 8.0 Final   • Specific Gravity, UA 03/02/2020 1.020  1.005 - 1.030 Final   • Glucose, UA 03/02/2020 Negative  Negative Final   • Ketones, UA 03/02/2020 Negative  Negative Final   • Bilirubin, UA 03/02/2020 Negative  Negative Final   • Blood, UA 03/02/2020 Negative  Negative Final   • Protein, UA 03/02/2020 Negative  Negative Final   • Leuk  Esterase, UA 03/02/2020 Negative  Negative Final   • Nitrite, UA 03/02/2020 Negative  Negative Final   • Urobilinogen, UA 03/02/2020 0.2 E.U./dL  0.2 - 1.0 E.U./dL Final   • Hemoglobin A1C 03/02/2020 6.97* 4.80 - 5.60 % Final   • Total Cholesterol 03/02/2020 145* 150 - 200 mg/dL Final   • Triglycerides 03/02/2020 175* <=150 mg/dL Final   • HDL Cholesterol 03/02/2020 31* 40 - 59 mg/dL Final   • LDL Cholesterol  03/02/2020 79  <=100 mg/dL Final   • VLDL Cholesterol 03/02/2020 35  mg/dL Final   • LDL/HDL Ratio 03/02/2020 2.55  0.00 - 3.55 Final   • Microalbumin/Creatinine Ratio 03/02/2020    Final    Unable to calculate   • Creatinine, Urine 03/02/2020 68.7  mg/dL Final   • Microalbumin, Urine 03/02/2020 <1.2  mg/dL Final   • WBC 03/02/2020 8.98  3.40 - 10.80 10*3/mm3 Final   • RBC 03/02/2020 5.71  4.14 - 5.80 10*6/mm3 Final   • Hemoglobin 03/02/2020 12.1* 13.0 - 17.7 g/dL Final   • Hematocrit 03/02/2020 37.1* 37.5 - 51.0 % Final   • MCV 03/02/2020 65.0* 79.0 - 97.0 fL Final   • MCH 03/02/2020 21.2* 26.6 - 33.0 pg Final   • MCHC 03/02/2020 32.6  31.5 - 35.7 g/dL Final   • RDW 03/02/2020 17.5* 12.3 - 15.4 % Final   • RDW-SD 03/02/2020 40.1  37.0 - 54.0 fl Final   • MPV 03/02/2020 0.0* 6.0 - 12.0 fL Final   • Platelets 03/02/2020 121* 140 - 450 10*3/mm3 Final   • Scan Slide 03/02/2020    Final    See Manual Differential Results   • Neutrophil % 03/02/2020 45.0  42.7 - 76.0 % Final   • Lymphocyte % 03/02/2020 48.0* 19.6 - 45.3 % Final   • Monocyte % 03/02/2020 6.0  5.0 - 12.0 % Final   • Eosinophil % 03/02/2020 1.0  0.3 - 6.2 % Final   • Neutrophils Absolute 03/02/2020 4.04  1.70 - 7.00 10*3/mm3 Final   • Lymphocytes Absolute 03/02/2020 4.31* 0.70 - 3.10 10*3/mm3 Final   • Monocytes Absolute 03/02/2020 0.54  0.10 - 0.90 10*3/mm3 Final   • Eosinophils Absolute 03/02/2020 0.09  0.00 - 0.40 10*3/mm3 Final   • Hypochromia 03/02/2020 Slight/1+  None Seen Final   • Microcytes 03/02/2020 Mod/2+  None Seen Final   • WBC  Morphology 03/02/2020 Normal  Normal Final   • Platelet Estimate 03/02/2020 Decreased  Normal Final   • Large Platelets 03/02/2020 Slight/1+  None Seen Final   ]        .

## 2020-07-31 NOTE — TELEPHONE ENCOUNTER
Coreen Suárez Quin'Dee Marie, BABS  Phone Number: [phone]          Needs refill on amLODIPine (NORVASC) 5 MG, and also  needs refill on  lisinopril-hydrochlorothiazide (PRINZIDE,ZESTORETIC) 20-25 MG, to [facility]. Wife Lois deleon.       
Covering for Dr. Minor Carreno.  
Chloraprep

## 2020-09-09 ENCOUNTER — LAB (OUTPATIENT)
Dept: LAB | Facility: OTHER | Age: 65
End: 2020-09-09

## 2020-09-09 DIAGNOSIS — D50.8 IRON DEFICIENCY ANEMIA SECONDARY TO INADEQUATE DIETARY IRON INTAKE: ICD-10-CM

## 2020-09-09 DIAGNOSIS — I10 ESSENTIAL HYPERTENSION: Chronic | ICD-10-CM

## 2020-09-09 DIAGNOSIS — E11.9 TYPE 2 DIABETES MELLITUS WITHOUT COMPLICATION, WITHOUT LONG-TERM CURRENT USE OF INSULIN (HCC): Chronic | ICD-10-CM

## 2020-09-09 DIAGNOSIS — E78.2 MIXED HYPERLIPIDEMIA: Chronic | ICD-10-CM

## 2020-09-09 DIAGNOSIS — Z12.5 SCREENING FOR PROSTATE CANCER: ICD-10-CM

## 2020-09-09 LAB
ALBUMIN SERPL-MCNC: 4.6 G/DL (ref 3.5–5)
ALBUMIN/GLOB SERPL: 1.3 G/DL (ref 1.1–1.8)
ALP SERPL-CCNC: 84 U/L (ref 38–126)
ALT SERPL W P-5'-P-CCNC: 29 U/L
ANION GAP SERPL CALCULATED.3IONS-SCNC: 9 MMOL/L (ref 5–15)
ARTICHOKE IGE QN: 81 MG/DL (ref 0–100)
AST SERPL-CCNC: 34 U/L (ref 17–59)
BASOPHILS # BLD MANUAL: 0.09 10*3/MM3 (ref 0–0.2)
BASOPHILS NFR BLD AUTO: 1 % (ref 0–1.5)
BILIRUB SERPL-MCNC: 0.8 MG/DL (ref 0.2–1.3)
BILIRUB UR QL STRIP: NEGATIVE
BUN SERPL-MCNC: 17 MG/DL (ref 7–23)
BUN/CREAT SERPL: 16.5 (ref 7–25)
CALCIUM SPEC-SCNC: 9.6 MG/DL (ref 8.4–10.2)
CHLORIDE SERPL-SCNC: 102 MMOL/L (ref 101–112)
CLARITY UR: CLEAR
CO2 SERPL-SCNC: 28 MMOL/L (ref 22–30)
COLOR UR: YELLOW
CREAT SERPL-MCNC: 1.03 MG/DL (ref 0.7–1.3)
DEPRECATED RDW RBC AUTO: 40.1 FL (ref 37–54)
EOSINOPHIL # BLD MANUAL: 0.09 10*3/MM3 (ref 0–0.4)
EOSINOPHIL NFR BLD MANUAL: 1 % (ref 0.3–6.2)
ERYTHROCYTE [DISTWIDTH] IN BLOOD BY AUTOMATED COUNT: 17 % (ref 12.3–15.4)
GFR SERPL CREATININE-BSD FRML MDRD: 88 ML/MIN/1.73 (ref 49–113)
GLOBULIN UR ELPH-MCNC: 3.6 GM/DL (ref 2.3–3.5)
GLUCOSE SERPL-MCNC: 113 MG/DL (ref 70–99)
GLUCOSE UR STRIP-MCNC: NEGATIVE MG/DL
HBA1C MFR BLD: 6.77 % (ref 4.8–5.6)
HCT VFR BLD AUTO: 35.5 % (ref 37.5–51)
HGB BLD-MCNC: 11.7 G/DL (ref 13–17.7)
HGB UR QL STRIP.AUTO: NEGATIVE
HYPOCHROMIA BLD QL: ABNORMAL
KETONES UR QL STRIP: NEGATIVE
LARGE PLATELETS: ABNORMAL
LEUKOCYTE ESTERASE UR QL STRIP.AUTO: NEGATIVE
LYMPHOCYTES # BLD MANUAL: 3.81 10*3/MM3 (ref 0.7–3.1)
LYMPHOCYTES NFR BLD MANUAL: 43 % (ref 19.6–45.3)
LYMPHOCYTES NFR BLD MANUAL: 9 % (ref 5–12)
MCH RBC QN AUTO: 21.9 PG (ref 26.6–33)
MCHC RBC AUTO-ENTMCNC: 33 G/DL (ref 31.5–35.7)
MCV RBC AUTO: 66.4 FL (ref 79–97)
MICROCYTES BLD QL: ABNORMAL
MONOCYTES # BLD AUTO: 0.8 10*3/MM3 (ref 0.1–0.9)
NEUTROPHILS # BLD AUTO: 4.08 10*3/MM3 (ref 1.7–7)
NEUTROPHILS NFR BLD MANUAL: 46 % (ref 42.7–76)
NITRITE UR QL STRIP: NEGATIVE
PH UR STRIP.AUTO: 5.5 [PH] (ref 5.5–8)
PLATELET # BLD AUTO: 122 10*3/MM3 (ref 140–450)
PMV BLD AUTO: 0 FL (ref 6–12)
POTASSIUM SERPL-SCNC: 4 MMOL/L (ref 3.4–5)
PROT SERPL-MCNC: 8.2 G/DL (ref 6.3–8.6)
PROT UR QL STRIP: NEGATIVE
PSA SERPL-MCNC: 0.88 NG/ML (ref 0–4)
RBC # BLD AUTO: 5.35 10*6/MM3 (ref 4.14–5.8)
SCAN SLIDE: NORMAL
SMALL PLATELETS BLD QL SMEAR: ABNORMAL
SODIUM SERPL-SCNC: 139 MMOL/L (ref 137–145)
SP GR UR STRIP: 1.01 (ref 1–1.03)
T4 FREE SERPL-MCNC: 1.55 NG/DL (ref 0.93–1.7)
TRIGL SERPL-MCNC: 151 MG/DL
TSH SERPL DL<=0.05 MIU/L-ACNC: 1.31 UIU/ML (ref 0.27–4.2)
UROBILINOGEN UR QL STRIP: NORMAL
WBC # BLD AUTO: 8.87 10*3/MM3 (ref 3.4–10.8)
WBC MORPH BLD: NORMAL

## 2020-09-09 PROCEDURE — 84439 ASSAY OF FREE THYROXINE: CPT | Performed by: INTERNAL MEDICINE

## 2020-09-09 PROCEDURE — G0103 PSA SCREENING: HCPCS | Performed by: INTERNAL MEDICINE

## 2020-09-09 PROCEDURE — 84443 ASSAY THYROID STIM HORMONE: CPT | Performed by: INTERNAL MEDICINE

## 2020-09-09 PROCEDURE — 83036 HEMOGLOBIN GLYCOSYLATED A1C: CPT | Performed by: INTERNAL MEDICINE

## 2020-09-09 PROCEDURE — 83721 ASSAY OF BLOOD LIPOPROTEIN: CPT | Performed by: INTERNAL MEDICINE

## 2020-09-09 PROCEDURE — 36415 COLL VENOUS BLD VENIPUNCTURE: CPT | Performed by: INTERNAL MEDICINE

## 2020-09-09 PROCEDURE — 84478 ASSAY OF TRIGLYCERIDES: CPT | Performed by: INTERNAL MEDICINE

## 2020-09-09 PROCEDURE — 80053 COMPREHEN METABOLIC PANEL: CPT | Performed by: INTERNAL MEDICINE

## 2020-09-09 PROCEDURE — 81003 URINALYSIS AUTO W/O SCOPE: CPT | Performed by: INTERNAL MEDICINE

## 2020-09-09 PROCEDURE — 85025 COMPLETE CBC W/AUTO DIFF WBC: CPT | Performed by: INTERNAL MEDICINE

## 2020-09-15 ENCOUNTER — OFFICE VISIT (OUTPATIENT)
Dept: FAMILY MEDICINE CLINIC | Facility: CLINIC | Age: 65
End: 2020-09-15

## 2020-09-15 VITALS
OXYGEN SATURATION: 99 % | HEIGHT: 71 IN | TEMPERATURE: 97.6 F | DIASTOLIC BLOOD PRESSURE: 72 MMHG | WEIGHT: 229 LBS | HEART RATE: 74 BPM | SYSTOLIC BLOOD PRESSURE: 124 MMHG | BODY MASS INDEX: 32.06 KG/M2

## 2020-09-15 DIAGNOSIS — D50.8 IRON DEFICIENCY ANEMIA SECONDARY TO INADEQUATE DIETARY IRON INTAKE: Chronic | ICD-10-CM

## 2020-09-15 DIAGNOSIS — I10 ESSENTIAL HYPERTENSION: Chronic | ICD-10-CM

## 2020-09-15 DIAGNOSIS — M19.91 PRIMARY LOCALIZED OSTEOARTHROSIS OF MULTIPLE SITES: ICD-10-CM

## 2020-09-15 DIAGNOSIS — E78.2 MIXED HYPERLIPIDEMIA: Chronic | ICD-10-CM

## 2020-09-15 DIAGNOSIS — E11.9 TYPE 2 DIABETES MELLITUS WITHOUT COMPLICATION, WITHOUT LONG-TERM CURRENT USE OF INSULIN (HCC): Primary | Chronic | ICD-10-CM

## 2020-09-15 PROCEDURE — 99214 OFFICE O/P EST MOD 30 MIN: CPT | Performed by: INTERNAL MEDICINE

## 2020-09-15 NOTE — PROGRESS NOTES
Chief Complaint   Patient presents with   • Hypertension     6 month f/u on labs    • Hyperlipidemia   • Back Pain     patient states that he bent over and hurt the lower part of his back,      Subjective   Tao Cuadra is a 65 y.o. male who presents to the office for follow-up and review of labs.  He has diabetes and his blood sugar has been doing well.  He takes metformin for treatment of his diabetes, and has been compliant with the medication.  He has hypertension and his blood pressure has been doing well.  He takes lisinopril HCT and amlodipine.  He has hyperlipidemia and takes pravastatin daily.  He has iron deficiency anemia and takes a daily iron supplement.  He has osteoarthritis which occasionally flares up and affects his knees.  He takes over-the-counter ibuprofen as needed.      His osteoarthritis also affects his low back.  This flares up once every year or two.  He was bending over to  an object in a store a couple of weeks ago when he felt something catch in his low back.  He states that it feels much better now but he is still having some occasional stiffness.  He has only taken a couple of doses of ibuprofen, and it seemed to help at the time.  The pain is in the low back on the left side.  There is no radiation into the lower extremities.  He denies any bowel or bladder symptoms.    The following portions of the patient's history were reviewed and updated as appropriate: allergies, current medications, past family history, past medical history, past social history, past surgical history and problem list.    Review of Systems   Constitutional: Negative for chills, fatigue and fever.   HENT: Negative for congestion, sneezing, sore throat and trouble swallowing.    Eyes: Negative for visual disturbance.   Respiratory: Negative for cough, chest tightness, shortness of breath and wheezing.    Cardiovascular: Negative for chest pain, palpitations and leg swelling.   Gastrointestinal: Negative  "for abdominal pain, constipation, diarrhea, nausea and vomiting.   Genitourinary: Negative for dysuria, frequency and urgency.   Musculoskeletal: Positive for arthralgias and back pain. Negative for neck pain.   Skin: Negative for rash.   Neurological: Negative for dizziness, weakness and headaches.   Psychiatric/Behavioral:        Patient denies any feelings of depression and has not felt down, hopeless or lost interest in any activities.   All other systems reviewed and are negative.      Objective   Vitals:    09/15/20 0957   BP: 124/72   Pulse: 74   Temp: 97.6 °F (36.4 °C)   TempSrc: Oral   SpO2: 99%   Weight: 104 kg (229 lb)   Height: 180.3 cm (71\")   PainSc:   7   PainLoc: Back  Comment: lower      Body mass index is 31.94 kg/m².    Physical Exam   Constitutional: He is oriented to person, place, and time. He appears well-developed. No distress.   HENT:   Head: Normocephalic and atraumatic.   Nose: Nose normal.   Mouth/Throat: No oropharyngeal exudate.   Eyes: Pupils are equal, round, and reactive to light. Conjunctivae are normal. No scleral icterus.   Neck: Normal range of motion. Neck supple.   Cardiovascular: Normal rate, regular rhythm and normal heart sounds. Exam reveals no gallop and no friction rub.   No murmur heard.  Pulmonary/Chest: Effort normal and breath sounds normal. No respiratory distress. He has no wheezes. He has no rales.   Abdominal: Soft. Bowel sounds are normal. He exhibits no distension. There is no abdominal tenderness. There is no rebound and no guarding.   Musculoskeletal:      Lumbar back: He exhibits decreased range of motion.   Lymphadenopathy:     He has no cervical adenopathy.   Neurological: He is alert and oriented to person, place, and time. No cranial nerve deficit.   Skin: Skin is warm and dry. No rash noted.   Psychiatric: His behavior is normal. Judgment and thought content normal.   Nursing note and vitals reviewed.      Assessment/Plan   Tao was seen today for " hypertension, hyperlipidemia and back pain.    Diagnoses and all orders for this visit:    Type 2 diabetes mellitus without complication, without long-term current use of insulin (CMS/[facility])  -     Hemoglobin A1c; Future  -     Microalbumin / Creatinine Urine Ratio - Urine, Clean Catch; Future    Essential hypertension  -     Comprehensive Metabolic Panel; Future  -     T4, Free; Future  -     TSH; Future  -     Urinalysis With Culture If Indicated -; Future    Mixed hyperlipidemia  -     Lipid Panel; Future    Iron deficiency anemia secondary to inadequate dietary iron intake  -     CBC & Differential; Future    Primary localized osteoarthrosis of multiple sites         Labs are reviewed with patient.  His glucose is 113 with hemoglobin A1c of 6.77. His diabetes is controlled.  He will continue with metformin.  He may monitor blood sugar one time daily. His triglycerides are 151.  This is improved from the previous visit.  His LDL is at goal at 81.  He will continue with pravastatin for treatment of hyperlipidemia.  He has mild anemia which is stable.  His hemoglobin is 11.7, and hematocrit is 35.5.  He will continue with the daily iron supplement.  His blood pressure is controlled, and he will continue with his current blood pressure medication. His PSA is normal at 0.879.    He will continue with ibuprofen as needed for treatment of osteoarthritis.  I have recommended taking the ibuprofen 400 mg twice a day for the next 7 to 10 days on a scheduled basis to help with the arthritic flareup in his low back.  He will let me know if this does not improve, and I will proceed with further evaluation.  I discussed lumbar x-ray and an injection of Depo-Medrol, but he wishes to hold off at this time.    PHQ-2/PHQ-9 Depression Screening 9/15/2020   Little interest or pleasure in doing things 0   Feeling down, depressed, or hopeless 0   Trouble falling or staying asleep, or sleeping too much -   Feeling tired or having little  energy -   Poor appetite or overeating -   Feeling bad about yourself - or that you are a failure or have let yourself or your family down -   Trouble concentrating on things, such as reading the newspaper or watching television -   Moving or speaking so slowly that other people could have noticed. Or the opposite - being so fidgety or restless that you have been moving around a lot more than usual -   Thoughts that you would be better off dead, or of hurting yourself in some way -   Total Score 0   If you checked off any problems, how difficult have these problems made it for you to do your work, take care of things at home, or get along with other people? -         Lab on 09/09/2020   Component Date Value Ref Range Status   • Glucose 09/09/2020 113* 70 - 99 mg/dL Final   • BUN 09/09/2020 17  7 - 23 mg/dL Final   • Creatinine 09/09/2020 1.03  0.70 - 1.30 mg/dL Final   • Sodium 09/09/2020 139  137 - 145 mmol/L Final   • Potassium 09/09/2020 4.0  3.4 - 5.0 mmol/L Final   • Chloride 09/09/2020 102  101 - 112 mmol/L Final   • CO2 09/09/2020 28.0  22.0 - 30.0 mmol/L Final   • Calcium 09/09/2020 9.6  8.4 - 10.2 mg/dL Final   • Total Protein 09/09/2020 8.2  6.3 - 8.6 g/dL Final   • Albumin 09/09/2020 4.60  3.50 - 5.00 g/dL Final   • ALT (SGPT) 09/09/2020 29  <=50 U/L Final   • AST (SGOT) 09/09/2020 34  17 - 59 U/L Final   • Alkaline Phosphatase 09/09/2020 84  38 - 126 U/L Final   • Total Bilirubin 09/09/2020 0.8  0.2 - 1.3 mg/dL Final   • eGFR   Amer 09/09/2020 88  49 - 113 mL/min/1.73 Final   • Globulin 09/09/2020 3.6* 2.3 - 3.5 gm/dL Final   • A/G Ratio 09/09/2020 1.3  1.1 - 1.8 g/dL Final   • BUN/Creatinine Ratio 09/09/2020 16.5  7.0 - 25.0 Final   • Anion Gap 09/09/2020 9.0  5.0 - 15.0 mmol/L Final   • Free T4 09/09/2020 1.55  0.93 - 1.70 ng/dL Final   • TSH 09/09/2020 1.310  0.270 - 4.200 uIU/mL Final   • Color, UA 09/09/2020 Yellow  Yellow, Straw Final   • Appearance, UA 09/09/2020 Clear  Clear Final   • pH,  UA 09/09/2020 5.5  5.5 - 8.0 Final   • Specific Gravity, UA 09/09/2020 1.010  1.005 - 1.030 Final   • Glucose, UA 09/09/2020 Negative  Negative Final   • Ketones, UA 09/09/2020 Negative  Negative Final   • Bilirubin, UA 09/09/2020 Negative  Negative Final   • Blood, UA 09/09/2020 Negative  Negative Final   • Protein, UA 09/09/2020 Negative  Negative Final   • Leuk Esterase, UA 09/09/2020 Negative  Negative Final   • Nitrite, UA 09/09/2020 Negative  Negative Final   • Urobilinogen, UA 09/09/2020 0.2 E.U./dL  0.2 - 1.0 E.U./dL Final   • Hemoglobin A1C 09/09/2020 6.77* 4.80 - 5.60 % Final   • LDL Cholesterol  09/09/2020 81  0 - 100 mg/dL Final   • Triglycerides 09/09/2020 151* <=150 mg/dL Final   • PSA 09/09/2020 0.879  0.000 - 4.000 ng/mL Final   • WBC 09/09/2020 8.87  3.40 - 10.80 10*3/mm3 Final   • RBC 09/09/2020 5.35  4.14 - 5.80 10*6/mm3 Final   • Hemoglobin 09/09/2020 11.7* 13.0 - 17.7 g/dL Final   • Hematocrit 09/09/2020 35.5* 37.5 - 51.0 % Final   • MCV 09/09/2020 66.4* 79.0 - 97.0 fL Final   • MCH 09/09/2020 21.9* 26.6 - 33.0 pg Final   • MCHC 09/09/2020 33.0  31.5 - 35.7 g/dL Final   • RDW 09/09/2020 17.0* 12.3 - 15.4 % Final   • RDW-SD 09/09/2020 40.1  37.0 - 54.0 fl Final   • MPV 09/09/2020 0.0* 6.0 - 12.0 fL Final   • Platelets 09/09/2020 122* 140 - 450 10*3/mm3 Final   • Scan Slide 09/09/2020    Final    See Manual Differential Results   • Neutrophil % 09/09/2020 46.0  42.7 - 76.0 % Final   • Lymphocyte % 09/09/2020 43.0  19.6 - 45.3 % Final   • Monocyte % 09/09/2020 9.0  5.0 - 12.0 % Final   • Eosinophil % 09/09/2020 1.0  0.3 - 6.2 % Final   • Basophil % 09/09/2020 1.0  0.0 - 1.5 % Final   • Neutrophils Absolute 09/09/2020 4.08  1.70 - 7.00 10*3/mm3 Final   • Lymphocytes Absolute 09/09/2020 3.81* 0.70 - 3.10 10*3/mm3 Final   • Monocytes Absolute 09/09/2020 0.80  0.10 - 0.90 10*3/mm3 Final   • Eosinophils Absolute 09/09/2020 0.09  0.00 - 0.40 10*3/mm3 Final   • Basophils Absolute 09/09/2020 0.09  0.00 -  0.20 10*3/mm3 Final   • Hypochromia 09/09/2020 Slight/1+  None Seen Final   • Microcytes 09/09/2020 Mod/2+  None Seen Final   • WBC Morphology 09/09/2020 Normal  Normal Final   • Platelet Estimate 09/09/2020 Decreased  Normal Final   • Large Platelets 09/09/2020 Slight/1+  None Seen Final   ]        .

## 2020-09-21 ENCOUNTER — OFFICE VISIT (OUTPATIENT)
Dept: FAMILY MEDICINE CLINIC | Facility: CLINIC | Age: 65
End: 2020-09-21

## 2020-09-21 VITALS
SYSTOLIC BLOOD PRESSURE: 132 MMHG | OXYGEN SATURATION: 99 % | DIASTOLIC BLOOD PRESSURE: 82 MMHG | HEIGHT: 71 IN | BODY MASS INDEX: 32.06 KG/M2 | HEART RATE: 70 BPM | WEIGHT: 229 LBS | TEMPERATURE: 97.6 F

## 2020-09-21 DIAGNOSIS — M15.9 PRIMARY OSTEOARTHRITIS INVOLVING MULTIPLE JOINTS: Chronic | ICD-10-CM

## 2020-09-21 DIAGNOSIS — M54.50 ACUTE LEFT-SIDED LOW BACK PAIN WITHOUT SCIATICA: Primary | ICD-10-CM

## 2020-09-21 PROBLEM — M17.0 PRIMARY OSTEOARTHRITIS OF BOTH KNEES: Chronic | Status: RESOLVED | Noted: 2020-03-06 | Resolved: 2020-09-21

## 2020-09-21 PROCEDURE — 96372 THER/PROPH/DIAG INJ SC/IM: CPT | Performed by: INTERNAL MEDICINE

## 2020-09-21 PROCEDURE — 99213 OFFICE O/P EST LOW 20 MIN: CPT | Performed by: INTERNAL MEDICINE

## 2020-09-21 RX ORDER — MELOXICAM 15 MG/1
15 TABLET ORAL DAILY
Qty: 30 TABLET | Refills: 0 | Status: SHIPPED | OUTPATIENT
Start: 2020-09-21 | End: 2020-11-02 | Stop reason: SDUPTHER

## 2020-09-21 RX ORDER — METHYLPREDNISOLONE ACETATE 80 MG/ML
80 INJECTION, SUSPENSION INTRA-ARTICULAR; INTRALESIONAL; INTRAMUSCULAR; SOFT TISSUE ONCE
Status: COMPLETED | OUTPATIENT
Start: 2020-09-21 | End: 2020-09-21

## 2020-09-21 RX ORDER — TIZANIDINE 2 MG/1
2 TABLET ORAL EVERY 8 HOURS PRN
Qty: 60 TABLET | Refills: 0 | Status: SHIPPED | OUTPATIENT
Start: 2020-09-21 | End: 2020-11-02

## 2020-09-21 RX ADMIN — METHYLPREDNISOLONE ACETATE 80 MG: 80 INJECTION, SUSPENSION INTRA-ARTICULAR; INTRALESIONAL; INTRAMUSCULAR; SOFT TISSUE at 10:13

## 2020-09-21 NOTE — PROGRESS NOTES
Chief Complaint   Patient presents with   • Back Pain     patient states that his back pain is worse than it was the last visit , pain has increased      Subjective   Tao Cuadra is a 65 y.o. male who presents to the office complaining of continued pain in his low back.  He has osteoarthritis which affects his low back.  I saw him for a routine follow-up visit on 9/15/2020.  He was complaining of back pain at that visit.  I gave him an injection of Depo-Medrol 80 mg IM.  I also recommended that he take ibuprofen 400 mg twice a day for 1 to 2 weeks.  He has not noticed any improvement in his symptoms.      This flares up once every year or two, and his current symptoms are similar to previous flareups.  He was bending over to  an object in a store a few weeks ago when he felt something catch in his low back.  He states that it feels much better now but he is still having some occasional stiffness. The pain is in the low back on the left side.  There is no radiation into the lower extremities.  He denies any bowel or bladder symptoms.  He now is complaining of some pain on the right side of his low back.  This improves with walking.    The following portions of the patient's history were reviewed and updated as appropriate: allergies, current medications, past family history, past medical history, past social history, past surgical history and problem list.    Review of Systems   Constitutional: Negative for chills, fatigue and fever.   HENT: Negative for congestion, sneezing, sore throat and trouble swallowing.    Eyes: Negative for visual disturbance.   Respiratory: Negative for cough, chest tightness, shortness of breath and wheezing.    Cardiovascular: Negative for chest pain, palpitations and leg swelling.   Gastrointestinal: Negative for abdominal pain, constipation, diarrhea, nausea and vomiting.   Genitourinary: Negative for dysuria, frequency and urgency.   Musculoskeletal: Positive for arthralgias  "and back pain. Negative for neck pain.   Skin: Negative for rash.   Neurological: Negative for dizziness, weakness and headaches.   Psychiatric/Behavioral:        Patient denies any feelings of depression and has not felt down, hopeless or lost interest in any activities.   All other systems reviewed and are negative.      Objective   Vitals:    09/21/20 0959   BP: 132/82   Pulse: 70   Temp: 97.6 °F (36.4 °C)   TempSrc: Oral   SpO2: 99%   Weight: 104 kg (229 lb)   Height: 180.3 cm (71\")   PainSc:   8   PainLoc: Back     Body mass index is 31.94 kg/m².  Physical Exam  Vitals signs and nursing note reviewed.   Constitutional:       General: He is not in acute distress.     Appearance: He is well-developed.   HENT:      Head: Normocephalic and atraumatic.      Nose: Nose normal.      Mouth/Throat:      Pharynx: No oropharyngeal exudate.   Eyes:      General: No scleral icterus.     Conjunctiva/sclera: Conjunctivae normal.      Pupils: Pupils are equal, round, and reactive to light.   Neck:      Musculoskeletal: Normal range of motion and neck supple.   Cardiovascular:      Rate and Rhythm: Normal rate and regular rhythm.      Heart sounds: Normal heart sounds. No murmur. No friction rub. No gallop.    Pulmonary:      Effort: Pulmonary effort is normal. No respiratory distress.      Breath sounds: Normal breath sounds. No wheezing or rales.   Abdominal:      General: Bowel sounds are normal. There is no distension.      Palpations: Abdomen is soft.      Tenderness: There is no abdominal tenderness. There is no guarding or rebound.   Musculoskeletal:      Lumbar back: He exhibits decreased range of motion and pain.   Lymphadenopathy:      Cervical: No cervical adenopathy.   Skin:     General: Skin is warm and dry.      Findings: No rash.   Neurological:      Mental Status: He is alert and oriented to person, place, and time.      Cranial Nerves: No cranial nerve deficit.   Psychiatric:         Behavior: Behavior normal.  "        Thought Content: Thought content normal.         Judgment: Judgment normal.         Assessment/Plan   Tao was seen today for back pain.    Diagnoses and all orders for this visit:    Acute left-sided low back pain without sciatica  -     XR Spine Lumbar Complete 4+VW; Future  -     meloxicam (MOBIC) 15 MG tablet; Take 1 tablet by mouth Daily.  -     tiZANidine (ZANAFLEX) 2 MG tablet; Take 1 tablet by mouth Every 8 (Eight) Hours As Needed for Muscle Spasms.  -     methylPREDNISolone acetate (DEPO-medrol) injection 80 mg    Primary osteoarthritis involving multiple joints  -     XR Spine Lumbar Complete 4+VW; Future  -     meloxicam (MOBIC) 15 MG tablet; Take 1 tablet by mouth Daily.  -     tiZANidine (ZANAFLEX) 2 MG tablet; Take 1 tablet by mouth Every 8 (Eight) Hours As Needed for Muscle Spasms.  -     methylPREDNISolone acetate (DEPO-medrol) injection 80 mg      I will obtain an x-ray of the lumbar spine.  He is given meloxicam to help with inflammation.  He is also given Zanaflex to use as needed for muscle spasm.  He is given Depo-Medrol 80 mg IM to help with the arthritic pain and inflammation.  He will continue with range of motion exercises and walking.  He will let me know if symptoms worsen or fail to improve within the next week.         PHQ-2/PHQ-9 Depression Screening 9/21/2020   Little interest or pleasure in doing things 0   Feeling down, depressed, or hopeless 0   Trouble falling or staying asleep, or sleeping too much -   Feeling tired or having little energy -   Poor appetite or overeating -   Feeling bad about yourself - or that you are a failure or have let yourself or your family down -   Trouble concentrating on things, such as reading the newspaper or watching television -   Moving or speaking so slowly that other people could have noticed. Or the opposite - being so fidgety or restless that you have been moving around a lot more than usual -   Thoughts that you would be better off  dead, or of hurting yourself in some way -   Total Score 0   If you checked off any problems, how difficult have these problems made it for you to do your work, take care of things at home, or get along with other people? -

## 2020-10-12 RX ORDER — BLOOD SUGAR DIAGNOSTIC
STRIP MISCELLANEOUS
Qty: 100 EACH | Refills: 1 | Status: SHIPPED | OUTPATIENT
Start: 2020-10-12 | End: 2021-05-03

## 2020-11-02 ENCOUNTER — OFFICE VISIT (OUTPATIENT)
Dept: FAMILY MEDICINE CLINIC | Facility: CLINIC | Age: 65
End: 2020-11-02

## 2020-11-02 VITALS
BODY MASS INDEX: 32.62 KG/M2 | HEART RATE: 72 BPM | DIASTOLIC BLOOD PRESSURE: 68 MMHG | SYSTOLIC BLOOD PRESSURE: 124 MMHG | TEMPERATURE: 97.4 F | WEIGHT: 233 LBS | HEIGHT: 71 IN

## 2020-11-02 DIAGNOSIS — M15.9 PRIMARY OSTEOARTHRITIS INVOLVING MULTIPLE JOINTS: Chronic | ICD-10-CM

## 2020-11-02 DIAGNOSIS — M79.602 LEFT ARM PAIN: Primary | ICD-10-CM

## 2020-11-02 PROCEDURE — 96372 THER/PROPH/DIAG INJ SC/IM: CPT | Performed by: INTERNAL MEDICINE

## 2020-11-02 PROCEDURE — 99213 OFFICE O/P EST LOW 20 MIN: CPT | Performed by: INTERNAL MEDICINE

## 2020-11-02 RX ORDER — MELOXICAM 15 MG/1
15 TABLET ORAL DAILY
Qty: 30 TABLET | Refills: 5 | Status: SHIPPED | OUTPATIENT
Start: 2020-11-02 | End: 2022-03-28

## 2020-11-02 RX ORDER — METHYLPREDNISOLONE ACETATE 80 MG/ML
80 INJECTION, SUSPENSION INTRA-ARTICULAR; INTRALESIONAL; INTRAMUSCULAR; SOFT TISSUE ONCE
Status: COMPLETED | OUTPATIENT
Start: 2020-11-02 | End: 2020-11-02

## 2020-11-02 RX ADMIN — METHYLPREDNISOLONE ACETATE 80 MG: 80 INJECTION, SUSPENSION INTRA-ARTICULAR; INTRALESIONAL; INTRAMUSCULAR; SOFT TISSUE at 13:48

## 2020-11-02 NOTE — PROGRESS NOTES
Chief Complaint   Patient presents with   • Elbow Pain     patient states that he has elbow pain for 1 week, patient states that the pain radiates down the elbow to the wrist,      Subjective   Tao Cuadra is a 65 y.o. male who presents to the office complaining of pain in his left arm.  This started approximately 1 week ago.  The pain starts in his upper arm and radiates down through the elbow into the lower arm to the level of his wrist.  He denies any injury.  He describes it as a throbbing ache type discomfort.  He denies any numbness or tingling.  He has been playing video games quite a bit, and noticed that this increases his symptoms.  He has been taking ibuprofen and this gives him some relief.  He has not noticed any swelling in the arm.    The following portions of the patient's history were reviewed and updated as appropriate: allergies, current medications, past family history, past medical history, past social history, past surgical history and problem list.    Review of Systems   Constitutional: Negative for chills, fatigue and fever.   HENT: Negative for congestion, sneezing, sore throat and trouble swallowing.    Eyes: Negative for visual disturbance.   Respiratory: Negative for cough, chest tightness, shortness of breath and wheezing.    Cardiovascular: Negative for chest pain, palpitations and leg swelling.   Gastrointestinal: Negative for abdominal pain, constipation, diarrhea, nausea and vomiting.   Genitourinary: Negative for dysuria, frequency and urgency.   Musculoskeletal: Positive for arthralgias. Negative for neck pain.   Skin: Negative for rash.   Neurological: Negative for dizziness, weakness and headaches.   Psychiatric/Behavioral:        Patient denies any feelings of depression and has not felt down, hopeless or lost interest in any activities.   All other systems reviewed and are negative.      Objective   Vitals:    11/02/20 1322   BP: 124/68   Pulse: 72   Temp: 97.4 °F (36.3  "°C)   TempSrc: Oral   Weight: 106 kg (233 lb)   Height: 180.3 cm (71\")   PainSc:   6   PainLoc: Elbow     Body mass index is 32.5 kg/m².  Physical Exam  Vitals signs and nursing note reviewed.   Constitutional:       General: He is not in acute distress.     Appearance: He is well-developed.   HENT:      Head: Normocephalic and atraumatic.      Nose: Nose normal.      Mouth/Throat:      Pharynx: No oropharyngeal exudate.   Eyes:      General: No scleral icterus.     Conjunctiva/sclera: Conjunctivae normal.      Pupils: Pupils are equal, round, and reactive to light.   Neck:      Musculoskeletal: Normal range of motion and neck supple.   Cardiovascular:      Rate and Rhythm: Normal rate and regular rhythm.      Heart sounds: Normal heart sounds. No murmur. No friction rub. No gallop.    Pulmonary:      Effort: Pulmonary effort is normal. No respiratory distress.      Breath sounds: Normal breath sounds. No wheezing or rales.   Musculoskeletal: Normal range of motion.      Comments: He has full range of motion of the left arm.  His  strength is normal.  There is no swelling or spasm noted.  There is no tenderness to palpation.   Lymphadenopathy:      Cervical: No cervical adenopathy.   Skin:     General: Skin is warm and dry.      Findings: No rash.   Neurological:      Mental Status: He is alert and oriented to person, place, and time.      Cranial Nerves: No cranial nerve deficit.   Psychiatric:         Behavior: Behavior normal.         Thought Content: Thought content normal.         Judgment: Judgment normal.         Assessment/Plan   Diagnoses and all orders for this visit:    1. Left arm pain (Primary)  -     meloxicam (MOBIC) 15 MG tablet; Take 1 tablet by mouth Daily.  Dispense: 30 tablet; Refill: 5  -     methylPREDNISolone acetate (DEPO-medrol) injection 80 mg    2. Primary osteoarthritis involving multiple joints  -     meloxicam (MOBIC) 15 MG tablet; Take 1 tablet by mouth Daily.  Dispense: 30 tablet; " Refill: 5    His arm pain is most likely due to to overuse and is inflammatory in etiology.  He is given meloxicam to take daily for the inflammation.  This has worked well for him in the past.  He is also given Depo-Medrol 80 mg IM.  He will let me know if symptoms do not resolve.         PHQ-2/PHQ-9 Depression Screening 11/2/2020   Little interest or pleasure in doing things 0   Feeling down, depressed, or hopeless 0   Trouble falling or staying asleep, or sleeping too much -   Feeling tired or having little energy -   Poor appetite or overeating -   Feeling bad about yourself - or that you are a failure or have let yourself or your family down -   Trouble concentrating on things, such as reading the newspaper or watching television -   Moving or speaking so slowly that other people could have noticed. Or the opposite - being so fidgety or restless that you have been moving around a lot more than usual -   Thoughts that you would be better off dead, or of hurting yourself in some way -   Total Score 0   If you checked off any problems, how difficult have these problems made it for you to do your work, take care of things at home, or get along with other people? -

## 2020-11-17 ENCOUNTER — OFFICE VISIT (OUTPATIENT)
Dept: FAMILY MEDICINE CLINIC | Facility: CLINIC | Age: 65
End: 2020-11-17

## 2020-11-17 VITALS
SYSTOLIC BLOOD PRESSURE: 124 MMHG | TEMPERATURE: 98.4 F | HEART RATE: 75 BPM | BODY MASS INDEX: 32.62 KG/M2 | DIASTOLIC BLOOD PRESSURE: 74 MMHG | WEIGHT: 233 LBS | OXYGEN SATURATION: 100 % | HEIGHT: 71 IN

## 2020-11-17 DIAGNOSIS — M54.12 CERVICAL RADICULOPATHY: ICD-10-CM

## 2020-11-17 DIAGNOSIS — M25.522 LEFT ELBOW PAIN: ICD-10-CM

## 2020-11-17 DIAGNOSIS — M54.2 NECK PAIN: Primary | ICD-10-CM

## 2020-11-17 PROCEDURE — 99214 OFFICE O/P EST MOD 30 MIN: CPT | Performed by: INTERNAL MEDICINE

## 2020-11-17 NOTE — PROGRESS NOTES
Chief Complaint   Patient presents with   • Elbow Pain     left elbow pain, numbness and tingling in fingers radiates from elbow to left hand      Subjective   Tao Cuadra is a 65 y.o. male who presents to the office with continued pain in his left elbow/arm.  Complaining of pain in his left arm.  I saw him for this initially on 11/2/2020.  At that visit, he was given meloxicam for the pain and inflammation.  He was also given an injection of Depo-Medrol.       His pain started approximately 3 weeks ago, although he has had smaller flareups off and on for the past year. He denies any injury to the left arm/elbow.  He describes a throbbing and aching discomfort. There has been no swelling or bruising in the arm or elbow.  He is now also experiencing numbness and tingling in his arm.  This radiates from his neck down to his fingers.  His symptoms are mainly in the left arm, but he has occasionally noticed numbness in his right arm.     The following portions of the patient's history were reviewed and updated as appropriate: allergies, current medications, past family history, past medical history, past social history, past surgical history and problem list.    Review of Systems   Constitutional: Negative for chills, fatigue and fever.   HENT: Negative for congestion, sneezing, sore throat and trouble swallowing.    Eyes: Negative for visual disturbance.   Respiratory: Negative for cough, chest tightness, shortness of breath and wheezing.    Cardiovascular: Negative for chest pain, palpitations and leg swelling.   Gastrointestinal: Negative for abdominal pain, constipation, diarrhea, nausea and vomiting.   Genitourinary: Negative for dysuria, frequency and urgency.   Musculoskeletal: Positive for arthralgias. Negative for neck pain.   Skin: Negative for rash.   Neurological: Negative for dizziness, weakness and headaches.   Psychiatric/Behavioral:        Patient denies any feelings of depression and has not felt  "down, hopeless or lost interest in any activities.   All other systems reviewed and are negative.      Objective   Vitals:    11/17/20 1000   BP: 124/74   Pulse: 75   Temp: 98.4 °F (36.9 °C)   TempSrc: Oral   SpO2: 100%   Weight: 106 kg (233 lb)   Height: 180.3 cm (71\")   PainSc:   4   PainLoc: Elbow  Comment: left     Body mass index is 32.5 kg/m².  Physical Exam  Vitals signs and nursing note reviewed.   Constitutional:       General: He is not in acute distress.     Appearance: He is well-developed.   HENT:      Head: Normocephalic and atraumatic.      Nose: Nose normal.      Mouth/Throat:      Pharynx: No oropharyngeal exudate.   Eyes:      General: No scleral icterus.     Conjunctiva/sclera: Conjunctivae normal.      Pupils: Pupils are equal, round, and reactive to light.   Neck:      Musculoskeletal: Normal range of motion and neck supple.   Cardiovascular:      Rate and Rhythm: Normal rate and regular rhythm.      Heart sounds: Normal heart sounds. No murmur. No friction rub. No gallop.    Pulmonary:      Effort: Pulmonary effort is normal. No respiratory distress.      Breath sounds: Normal breath sounds. No wheezing or rales.   Musculoskeletal: Normal range of motion.      Cervical back: He exhibits tenderness and pain.      Comments: He has full range of motion of the left arm.  His  strength is normal.  There is no swelling or spasm noted.   Lymphadenopathy:      Cervical: No cervical adenopathy.   Skin:     General: Skin is warm and dry.      Findings: No rash.   Neurological:      Mental Status: He is alert and oriented to person, place, and time.      Cranial Nerves: No cranial nerve deficit.   Psychiatric:         Behavior: Behavior normal.         Thought Content: Thought content normal.         Judgment: Judgment normal.     Physical exam has been reviewed and validated on 11/17/2020and updated with any changes.      Assessment/Plan   Diagnoses and all orders for this visit:    1. Neck pain " (Primary)  -     XR Spine Cervical Complete 4 or 5 View; Future  -     Ambulatory Referral to Physical Therapy Evaluate and treat    2. Cervical radiculopathy  -     XR Spine Cervical Complete 4 or 5 View; Future  -     Ambulatory Referral to Physical Therapy Evaluate and treat    3. Left elbow pain    His symptoms now appear to be related to his neck.  He is having cervical radicular symptoms.  I will obtain an x-ray of the C-spine today.  I will refer him to physical therapy for evaluation and treatment of the neck pain and radicular symptoms.  The pain and numbness which he is experiencing in his left arm and elbow appear to be referred from the neck.  I will follow-up with him after he has been seen by physical therapy.  He will continue with meloxicam for the inflammation.  He will let me know if symptoms worsen.       PHQ-2/PHQ-9 Depression Screening 11/17/2020   Little interest or pleasure in doing things 0   Feeling down, depressed, or hopeless 0   Trouble falling or staying asleep, or sleeping too much -   Feeling tired or having little energy -   Poor appetite or overeating -   Feeling bad about yourself - or that you are a failure or have let yourself or your family down -   Trouble concentrating on things, such as reading the newspaper or watching television -   Moving or speaking so slowly that other people could have noticed. Or the opposite - being so fidgety or restless that you have been moving around a lot more than usual -   Thoughts that you would be better off dead, or of hurting yourself in some way -   Total Score 0   If you checked off any problems, how difficult have these problems made it for you to do your work, take care of things at home, or get along with other people? -

## 2020-11-30 ENCOUNTER — OFFICE VISIT (OUTPATIENT)
Dept: FAMILY MEDICINE CLINIC | Facility: CLINIC | Age: 65
End: 2020-11-30

## 2020-11-30 DIAGNOSIS — J01.00 ACUTE NON-RECURRENT MAXILLARY SINUSITIS: Primary | ICD-10-CM

## 2020-11-30 PROCEDURE — 99442 PR PHYS/QHP TELEPHONE EVALUATION 11-20 MIN: CPT | Performed by: INTERNAL MEDICINE

## 2020-11-30 RX ORDER — METHYLPREDNISOLONE 4 MG/1
4 TABLET ORAL DAILY
Qty: 5 TABLET | Refills: 0 | Status: SHIPPED | OUTPATIENT
Start: 2020-11-30 | End: 2020-12-05

## 2020-11-30 RX ORDER — AMOXICILLIN 500 MG/1
500 TABLET, FILM COATED ORAL 3 TIMES DAILY
Qty: 30 TABLET | Refills: 0 | Status: SHIPPED | OUTPATIENT
Start: 2020-11-30 | End: 2020-12-10

## 2020-11-30 NOTE — PROGRESS NOTES
Telemedicine visit    You have chosen to receive care through a telephone visit. Do you consent to use a telephone visit for your medical care today? Yes      Chief Complaint   Patient presents with   • Sinusitis     Subjective   Tao Cuadra is a 65 y.o. male who is seen via telephone visit.  He complains of nasal congestion, sinus pressure, sneezing and a nonproductive cough which started a few days ago.  He denies any fever or chills.  He has been taking over-the-counter cough and cold medications with no significant improvement in his symptoms.  His cough has improved over the past day.    The following portions of the patient's history were reviewed and updated as appropriate: allergies, current medications, past family history, past medical history, past social history, past surgical history and problem list.    Review of Systems   Constitutional: Negative for chills, fatigue and fever.   HENT: Positive for congestion, sinus pressure, sneezing and sore throat. Negative for trouble swallowing.    Eyes: Negative for visual disturbance.   Respiratory: Positive for cough. Negative for chest tightness, shortness of breath and wheezing.    Cardiovascular: Negative for chest pain, palpitations and leg swelling.   Gastrointestinal: Negative for abdominal pain, constipation, diarrhea, nausea and vomiting.   Genitourinary: Negative for dysuria, frequency and urgency.   Musculoskeletal: Negative for neck pain.   Skin: Negative for rash.   Neurological: Negative for dizziness, weakness and headaches.   Psychiatric/Behavioral:        Patient denies any feelings of depression and has not felt down, hopeless or lost interest in any activities.   All other systems reviewed and are negative.      Objective     Physical Exam  Constitutional:       General: He is not in acute distress.     Appearance: He is well-developed.   Neurological:      Mental Status: He is alert and oriented to person, place, and time.    Psychiatric:         Behavior: Behavior normal.         Thought Content: Thought content normal.         Judgment: Judgment normal.         Assessment/Plan             Diagnoses and all orders for this visit:    1. Acute non-recurrent maxillary sinusitis (Primary)  -     amoxicillin (AMOXIL) 500 MG tablet; Take 1 tablet by mouth 3 (Three) Times a Day for 10 days.  Dispense: 30 tablet; Refill: 0  -     methylPREDNISolone (MEDROL) 4 MG tablet; Take 1 tablet by mouth Daily for 5 days.  Dispense: 5 tablet; Refill: 0           He is given amoxicillin for treatment of the sinusitis.  He may use Mucinex to help with congestion.  He is given Medrol 4 mg daily x5 days to help with the cough and allergy symptoms.  He will let me know if symptoms worsen or fail to improve.    This visit has been rescheduled as a phone visit to comply with patient safety concerns in accordance with CDC recommendations. Total time of discussion was 15 minutes.    PHQ-2/PHQ-9 Depression Screening 11/17/2020   Little interest or pleasure in doing things 0   Feeling down, depressed, or hopeless 0   Trouble falling or staying asleep, or sleeping too much -   Feeling tired or having little energy -   Poor appetite or overeating -   Feeling bad about yourself - or that you are a failure or have let yourself or your family down -   Trouble concentrating on things, such as reading the newspaper or watching television -   Moving or speaking so slowly that other people could have noticed. Or the opposite - being so fidgety or restless that you have been moving around a lot more than usual -   Thoughts that you would be better off dead, or of hurting yourself in some way -   Total Score 0   If you checked off any problems, how difficult have these problems made it for you to do your work, take care of things at home, or get along with other people? -

## 2020-12-11 ENCOUNTER — TELEPHONE (OUTPATIENT)
Dept: FAMILY MEDICINE CLINIC | Facility: CLINIC | Age: 65
End: 2020-12-11

## 2020-12-11 DIAGNOSIS — M25.522 LEFT ELBOW PAIN: Primary | ICD-10-CM

## 2020-12-11 DIAGNOSIS — M79.602 LEFT ARM PAIN: ICD-10-CM

## 2020-12-11 NOTE — TELEPHONE ENCOUNTER
----- Message from Coreen Anderson sent at 12/11/2020  8:06 AM CST -----  Regarding: NEEDS REFERRAL  Contact: [phone]   Patient called and said that he has done the PT at [facility] and it did not go well and he wants to be referred to Ortho at [place]

## 2021-01-11 DIAGNOSIS — M25.522 LEFT ELBOW PAIN: Primary | ICD-10-CM

## 2021-01-13 ENCOUNTER — OFFICE VISIT (OUTPATIENT)
Dept: ORTHOPEDIC SURGERY | Facility: CLINIC | Age: 66
End: 2021-01-13

## 2021-01-13 VITALS — WEIGHT: 237 LBS | BODY MASS INDEX: 33.18 KG/M2 | HEIGHT: 71 IN

## 2021-01-13 DIAGNOSIS — R20.2 NUMBNESS AND TINGLING IN LEFT ARM: ICD-10-CM

## 2021-01-13 DIAGNOSIS — R20.0 NUMBNESS AND TINGLING OF RIGHT ARM: ICD-10-CM

## 2021-01-13 DIAGNOSIS — R20.2 NUMBNESS AND TINGLING OF RIGHT ARM: ICD-10-CM

## 2021-01-13 DIAGNOSIS — M25.522 LEFT ELBOW PAIN: Primary | ICD-10-CM

## 2021-01-13 DIAGNOSIS — R20.0 NUMBNESS AND TINGLING IN LEFT ARM: ICD-10-CM

## 2021-01-13 DIAGNOSIS — M54.12 LEFT CERVICAL RADICULOPATHY: ICD-10-CM

## 2021-01-13 DIAGNOSIS — I10 ESSENTIAL HYPERTENSION: ICD-10-CM

## 2021-01-13 DIAGNOSIS — E11.9 TYPE 2 DIABETES MELLITUS WITHOUT COMPLICATION, WITHOUT LONG-TERM CURRENT USE OF INSULIN (HCC): ICD-10-CM

## 2021-01-13 PROCEDURE — 99204 OFFICE O/P NEW MOD 45 MIN: CPT | Performed by: ORTHOPAEDIC SURGERY

## 2021-01-13 NOTE — PROGRESS NOTES
Tao Cuadra is a 65 y.o. male   Primary provider:  Juan Hodges MD       Chief Complaint   Patient presents with   • Left Elbow - Elbow Pain       HISTORY OF PRESENT ILLNESS: left elbow pain started around 11/2019, no known injury. xrays done today. Physical therapy at [facility] about 2 weeks, patient went to four visits. Tried meloxicam in the past it did not help.   1998-99 had neck injury and neck surgery.  Began having pain again in November  Pain was worsening and began numbness and tingling in right arm.  Also began having n/t, pain in left arm as well.    Pain down to left elbow and to small finger.  Did PT 4-5 visits without any improvement.  Numbness and tingling is worse when leaning forward.    Symptoms at night are fine when on his back but worsens if curled up.      Elbow Pain  This is a chronic problem. The current episode started more than 1 year ago. Associated symptoms include neck pain and numbness. Associated symptoms comments: Throbbing, stiffness, and tingling. . Exacerbated by: sitting.         CONCURRENT MEDICAL HISTORY:    Past Medical History:   Diagnosis Date   • Abnormal liver function tests    • Acute hepatitis C    • Alcohol abuse    • Chronic hepatitis C without hepatic coma (CMS/HCC) 4/28/2017   • Decreased platelet count (CMS/HCC)     Platelet count below reference range   • Diabetes mellitus (CMS/HCC)     Type II   • Essential hypertension    • Hyperlipidemia    • Laceration 11/16/2018       Allergies   Allergen Reactions   • Morphine And Related Nausea And Vomiting         Current Outpatient Medications:   •  amLODIPine (NORVASC) 5 MG tablet, Take 1 tablet by mouth Daily., Disp: 90 tablet, Rfl: 3  •  cetirizine (zyrTEC) 10 MG tablet, Take 10 mg by mouth Daily., Disp: , Rfl:   •  iron polysaccharides (FERREX 150) 150 MG capsule, Take 1 capsule by mouth Daily., Disp: 90 capsule, Rfl: 3  •  Lancets 30G misc, Check blood sugar every morning, Disp: , Rfl:   •   lisinopril-hydrochlorothiazide (PRINZIDE,ZESTORETIC) 20-25 MG per tablet, Take 1 tablet by mouth Daily., Disp: 90 tablet, Rfl: 3  •  metFORMIN ER (GLUCOPHAGE-XR) 500 MG 24 hr tablet, Take 2 tablets by mouth 2 (Two) Times a Day., Disp: 360 tablet, Rfl: 3  •  OneTouch Ultra test strip, USE ONE STRIP TO CHECK GLUCOSE ONCE DAILY, Disp: 100 each, Rfl: 1  •  pravastatin (PRAVACHOL) 80 MG tablet, Take 1 tablet by mouth Daily., Disp: 90 tablet, Rfl: 3  •  meloxicam (MOBIC) 15 MG tablet, Take 1 tablet by mouth Daily., Disp: 30 tablet, Rfl: 5    Past Surgical History:   Procedure Laterality Date   • COLONOSCOPY     • TOE SURGERY Left     Big toe cut, repaired   • WRIST SURGERY Right     fracture softball Injury       Family History   Problem Relation Age of Onset   • No Known Problems Mother    • No Known Problems Father    • Diabetes Brother    • Heart disease Brother    • No Known Problems Daughter    • No Known Problems Son    • Hypertension Maternal Grandmother         Social History     Socioeconomic History   • Marital status:      Spouse name: Not on file   • Number of children: Not on file   • Years of education: Not on file   • Highest education level: Not on file   Tobacco Use   • Smoking status: Former Smoker     Quit date:      Years since quittin.0   • Smokeless tobacco: Never Used   Substance and Sexual Activity   • Alcohol use: No   • Drug use: No   • Sexual activity: Defer        Review of Systems   Constitutional: Positive for appetite change.   HENT: Positive for sneezing.    Eyes: Positive for itching.   Respiratory: Negative.    Cardiovascular: Negative.    Gastrointestinal: Negative.    Endocrine: Negative.    Genitourinary: Negative.    Musculoskeletal: Positive for back pain, neck pain and neck stiffness.   Skin: Negative.    Allergic/Immunologic: Negative.    Neurological: Positive for numbness.   Hematological: Negative.    Psychiatric/Behavioral: Negative.    All other systems  "reviewed and are negative.      PHYSICAL EXAMINATION:       Ht 180.3 cm (71\")   Wt 108 kg (237 lb)   BMI 33.05 kg/m²     Physical Exam  Constitutional:       General: He is not in acute distress.     Appearance: Normal appearance. He is well-developed. He is not ill-appearing.   Pulmonary:      Effort: Pulmonary effort is normal. No respiratory distress.   Neurological:      Mental Status: He is alert and oriented to person, place, and time.   Psychiatric:         Mood and Affect: Mood normal.         Behavior: Behavior normal.         Thought Content: Thought content normal.         Judgment: Judgment normal.         GAIT:     [x]  Normal  []  Antalgic    Assistive device: [x]  None  []  Walker     []  Crutches  []  Cane     []  Wheelchair  []  Stretcher    Back Exam     Comments:  Mild general stiffness in c-spine  Mild increase in symptoms with spurling on right        Right Elbow Exam     Range of Motion   The patient has normal right elbow ROM.    Muscle Strength   The patient has normal right elbow strength.    Tests   Tinel's sign (cubital tunnel): positive (Mildly positive)      Left Elbow Exam     Tenderness   The patient is experiencing no tenderness.     Range of Motion   The patient has normal left elbow ROM.    Tests   Varus: negative  Valgus: negative  Tinel's sign (cubital tunnel): positive    Other   Erythema: absent  Sensation: normal  Pulse: present              Xr Elbow 3+ View Left    Result Date: 1/13/2021  Narrative: Ordering Provider:  Theodore Mckeon MD Ordering Diagnosis/Indication:  Left elbow pain Procedure:  XR ELBOW 3+ VW LEFT Exam Date:  1/13/21 COMPARISON:  Not applicable, no relevant images available.     Impression:  4 views of the left elbow show acceptable position and alignment with no evidence of acute bony abnormality.  No fracture or dislocation is noted. Theodore Mckeon MD 1/13/21           ASSESSMENT:    Diagnoses and all orders for this visit:    Left elbow " pain  -     EMG & Nerve Conduction Test; Future    Left cervical radiculopathy  -     EMG & Nerve Conduction Test; Future    Numbness and tingling in left arm  -     EMG & Nerve Conduction Test; Future    Essential hypertension    Type 2 diabetes mellitus without complication, without long-term current use of insulin (CMS/[facility])    Numbness and tingling of right arm  -     EMG & Nerve Conduction Test; Future          PLAN    Patient has had previous cervical spine surgery in the remote past.  He has had numbness and tingling in both arms but it has predominantly involved his left arm more recently.  Exam findings are consistent with possibility of cubital tunnel syndrome but could also be the result of recurrent or progressive degenerative change in the cervical spine with nerve root compression.  We discussed proceeding with an EMG to assess these 2 possibilities.    We also discussed continuing meloxicam for nonsteroidal anti-inflammatory medication.    We also discussed that maintaining adequate blood sugar control is important as numbness and tingling can also be a neuropathy type symptom.  His blood sugar control has been good recently.    Follow-up after the EMG.    Return for after EMG.    Theodore Mckeon MD

## 2021-02-04 NOTE — TELEPHONE ENCOUNTER
----- Message from Coreen Anderson sent at 5/28/2019  9:15 AM CDT -----  Regarding: MED REFILL  Contact: [phone]  Needs refill on amLODIPine (NORVASC) 5 MG, and needs lisinopril-hydrochlorothiazide (PRINZIDE,ZESTORETIC) 20-25 MG, and needs ONE TOUCH ULTRA TEST test strip, to [facility].   no

## 2021-02-23 DIAGNOSIS — I10 ESSENTIAL HYPERTENSION: Chronic | ICD-10-CM

## 2021-02-24 RX ORDER — AMLODIPINE BESYLATE 5 MG/1
5 TABLET ORAL DAILY
Qty: 90 TABLET | Refills: 1 | Status: SHIPPED | OUTPATIENT
Start: 2021-02-24 | End: 2021-09-27 | Stop reason: SDUPTHER

## 2021-02-24 RX ORDER — LISINOPRIL AND HYDROCHLOROTHIAZIDE 25; 20 MG/1; MG/1
1 TABLET ORAL DAILY
Qty: 90 TABLET | Refills: 1 | Status: SHIPPED | OUTPATIENT
Start: 2021-02-24 | End: 2021-09-20

## 2021-02-25 DIAGNOSIS — M54.12 LEFT CERVICAL RADICULOPATHY: ICD-10-CM

## 2021-02-25 DIAGNOSIS — R20.0 NUMBNESS AND TINGLING OF RIGHT ARM: ICD-10-CM

## 2021-02-25 DIAGNOSIS — R20.0 NUMBNESS AND TINGLING IN LEFT ARM: ICD-10-CM

## 2021-02-25 DIAGNOSIS — R20.2 NUMBNESS AND TINGLING IN LEFT ARM: ICD-10-CM

## 2021-02-25 DIAGNOSIS — R20.2 NUMBNESS AND TINGLING OF RIGHT ARM: ICD-10-CM

## 2021-02-25 DIAGNOSIS — M25.522 LEFT ELBOW PAIN: ICD-10-CM

## 2021-03-09 ENCOUNTER — LAB (OUTPATIENT)
Dept: LAB | Facility: OTHER | Age: 66
End: 2021-03-09

## 2021-03-09 DIAGNOSIS — E11.9 TYPE 2 DIABETES MELLITUS WITHOUT COMPLICATION, WITHOUT LONG-TERM CURRENT USE OF INSULIN (HCC): Chronic | ICD-10-CM

## 2021-03-09 DIAGNOSIS — D50.8 IRON DEFICIENCY ANEMIA SECONDARY TO INADEQUATE DIETARY IRON INTAKE: ICD-10-CM

## 2021-03-09 DIAGNOSIS — E78.2 MIXED HYPERLIPIDEMIA: Chronic | ICD-10-CM

## 2021-03-09 DIAGNOSIS — I10 ESSENTIAL HYPERTENSION: Chronic | ICD-10-CM

## 2021-03-09 LAB
ALBUMIN SERPL-MCNC: 4.5 G/DL (ref 3.5–5)
ALBUMIN/GLOB SERPL: 1.3 G/DL (ref 1.1–1.8)
ALP SERPL-CCNC: 81 U/L (ref 38–126)
ALT SERPL W P-5'-P-CCNC: 44 U/L
ANION GAP SERPL CALCULATED.3IONS-SCNC: 12 MMOL/L (ref 5–15)
ANISOCYTOSIS BLD QL: NORMAL
AST SERPL-CCNC: 36 U/L (ref 17–59)
BILIRUB SERPL-MCNC: 0.7 MG/DL (ref 0.2–1.3)
BILIRUB UR QL STRIP: NEGATIVE
BUN SERPL-MCNC: 15 MG/DL (ref 7–23)
BUN/CREAT SERPL: 16.7 (ref 7–25)
CALCIUM SPEC-SCNC: 9.5 MG/DL (ref 8.4–10.2)
CHLORIDE SERPL-SCNC: 105 MMOL/L (ref 101–112)
CHOLEST SERPL-MCNC: 145 MG/DL (ref 150–200)
CLARITY UR: ABNORMAL
CO2 SERPL-SCNC: 25 MMOL/L (ref 22–30)
COLOR UR: YELLOW
CREAT SERPL-MCNC: 0.9 MG/DL (ref 0.7–1.3)
DEPRECATED RDW RBC AUTO: 37.7 FL (ref 37–54)
EOSINOPHIL # BLD MANUAL: 0.07 10*3/MM3 (ref 0–0.4)
EOSINOPHIL NFR BLD MANUAL: 1 % (ref 0.3–6.2)
ERYTHROCYTE [DISTWIDTH] IN BLOOD BY AUTOMATED COUNT: 16.2 % (ref 12.3–15.4)
GFR SERPL CREATININE-BSD FRML MDRD: 102 ML/MIN/1.73 (ref 49–113)
GLOBULIN UR ELPH-MCNC: 3.6 GM/DL (ref 2.3–3.5)
GLUCOSE SERPL-MCNC: 125 MG/DL (ref 70–99)
GLUCOSE UR STRIP-MCNC: NEGATIVE MG/DL
HBA1C MFR BLD: 6.7 % (ref 4.8–5.6)
HCT VFR BLD AUTO: 37.3 % (ref 37.5–51)
HDLC SERPL-MCNC: 27 MG/DL (ref 40–59)
HGB BLD-MCNC: 12.2 G/DL (ref 13–17.7)
HGB UR QL STRIP.AUTO: NEGATIVE
KETONES UR QL STRIP: NEGATIVE
LARGE PLATELETS: NORMAL
LDLC SERPL CALC-MCNC: 92 MG/DL
LDLC/HDLC SERPL: 3.29 {RATIO} (ref 0–3.55)
LEUKOCYTE ESTERASE UR QL STRIP.AUTO: NEGATIVE
LYMPHOCYTES # BLD MANUAL: 2.96 10*3/MM3 (ref 0.7–3.1)
LYMPHOCYTES NFR BLD MANUAL: 41 % (ref 19.6–45.3)
LYMPHOCYTES NFR BLD MANUAL: 9 % (ref 5–12)
MCH RBC QN AUTO: 21.6 PG (ref 26.6–33)
MCHC RBC AUTO-ENTMCNC: 32.7 G/DL (ref 31.5–35.7)
MCV RBC AUTO: 66.1 FL (ref 79–97)
MICROCYTES BLD QL: NORMAL
MONOCYTES # BLD AUTO: 0.65 10*3/MM3 (ref 0.1–0.9)
NEUTROPHILS # BLD AUTO: 3.39 10*3/MM3 (ref 1.7–7)
NEUTROPHILS NFR BLD MANUAL: 46 % (ref 42.7–76)
NEUTS BAND NFR BLD MANUAL: 1 % (ref 0–5)
NITRITE UR QL STRIP: NEGATIVE
PH UR STRIP.AUTO: 6 [PH] (ref 5.5–8)
PLATELET # BLD AUTO: 137 10*3/MM3 (ref 140–450)
PMV BLD AUTO: 0 FL (ref 6–12)
POTASSIUM SERPL-SCNC: 3.6 MMOL/L (ref 3.4–5)
PROT SERPL-MCNC: 8.1 G/DL (ref 6.3–8.6)
PROT UR QL STRIP: NEGATIVE
RBC # BLD AUTO: 5.64 10*6/MM3 (ref 4.14–5.8)
SCAN SLIDE: NORMAL
SMALL PLATELETS BLD QL SMEAR: NORMAL
SODIUM SERPL-SCNC: 142 MMOL/L (ref 137–145)
SP GR UR STRIP: 1.02 (ref 1–1.03)
T4 FREE SERPL-MCNC: 1.65 NG/DL (ref 0.93–1.7)
TRIGL SERPL-MCNC: 146 MG/DL
TSH SERPL DL<=0.05 MIU/L-ACNC: 0.04 UIU/ML (ref 0.27–4.2)
UROBILINOGEN UR QL STRIP: ABNORMAL
VARIANT LYMPHS NFR BLD MANUAL: 2 % (ref 0–5)
VLDLC SERPL-MCNC: 26 MG/DL (ref 5–40)
WBC # BLD AUTO: 7.22 10*3/MM3 (ref 3.4–10.8)
WBC MORPH BLD: NORMAL

## 2021-03-09 PROCEDURE — 84443 ASSAY THYROID STIM HORMONE: CPT | Performed by: INTERNAL MEDICINE

## 2021-03-09 PROCEDURE — 82043 UR ALBUMIN QUANTITATIVE: CPT | Performed by: INTERNAL MEDICINE

## 2021-03-09 PROCEDURE — 81003 URINALYSIS AUTO W/O SCOPE: CPT | Performed by: INTERNAL MEDICINE

## 2021-03-09 PROCEDURE — 36415 COLL VENOUS BLD VENIPUNCTURE: CPT | Performed by: INTERNAL MEDICINE

## 2021-03-09 PROCEDURE — 80061 LIPID PANEL: CPT | Performed by: INTERNAL MEDICINE

## 2021-03-09 PROCEDURE — 85025 COMPLETE CBC W/AUTO DIFF WBC: CPT | Performed by: INTERNAL MEDICINE

## 2021-03-09 PROCEDURE — 82570 ASSAY OF URINE CREATININE: CPT | Performed by: INTERNAL MEDICINE

## 2021-03-09 PROCEDURE — 80053 COMPREHEN METABOLIC PANEL: CPT | Performed by: INTERNAL MEDICINE

## 2021-03-09 PROCEDURE — 83036 HEMOGLOBIN GLYCOSYLATED A1C: CPT | Performed by: INTERNAL MEDICINE

## 2021-03-09 PROCEDURE — 84439 ASSAY OF FREE THYROXINE: CPT | Performed by: INTERNAL MEDICINE

## 2021-03-09 RX ORDER — METFORMIN HYDROCHLORIDE 500 MG/1
1000 TABLET, EXTENDED RELEASE ORAL 2 TIMES DAILY
Qty: 360 TABLET | Refills: 0 | Status: SHIPPED | OUTPATIENT
Start: 2021-03-09 | End: 2021-03-25 | Stop reason: SDUPTHER

## 2021-03-10 LAB
ALBUMIN UR-MCNC: 2.5 MG/DL
CREAT UR-MCNC: 161.9 MG/DL
MICROALBUMIN/CREAT UR: 15.4 MG/G

## 2021-03-11 ENCOUNTER — OFFICE VISIT (OUTPATIENT)
Dept: ORTHOPEDIC SURGERY | Facility: CLINIC | Age: 66
End: 2021-03-11

## 2021-03-11 VITALS — BODY MASS INDEX: 33 KG/M2 | HEIGHT: 71 IN | WEIGHT: 235.7 LBS

## 2021-03-11 DIAGNOSIS — R20.0 NUMBNESS AND TINGLING OF RIGHT ARM: ICD-10-CM

## 2021-03-11 DIAGNOSIS — M54.12 LEFT CERVICAL RADICULOPATHY: ICD-10-CM

## 2021-03-11 DIAGNOSIS — R20.0 NUMBNESS AND TINGLING IN LEFT ARM: ICD-10-CM

## 2021-03-11 DIAGNOSIS — M25.522 LEFT ELBOW PAIN: Primary | ICD-10-CM

## 2021-03-11 DIAGNOSIS — R20.2 NUMBNESS AND TINGLING IN LEFT ARM: ICD-10-CM

## 2021-03-11 DIAGNOSIS — R20.2 NUMBNESS AND TINGLING OF RIGHT ARM: ICD-10-CM

## 2021-03-11 DIAGNOSIS — I10 ESSENTIAL HYPERTENSION: ICD-10-CM

## 2021-03-11 PROCEDURE — 99213 OFFICE O/P EST LOW 20 MIN: CPT | Performed by: ORTHOPAEDIC SURGERY

## 2021-03-11 NOTE — PROGRESS NOTES
"Tao Cuadra is a 65 y.o. male returns for     Chief Complaint   Patient presents with   • Left Elbow - Follow-up   • Results     EMG Dr Stewart 2/23/21       HISTORY OF PRESENT ILLNESS:  He thinks that the numbness and tingling in his hands and arm is slightly improved.  No new injury.     CONCURRENT MEDICAL HISTORY:    The following portions of the patient's history were reviewed and updated as appropriate: allergies, current medications, past family history, past medical history, past social history, past surgical history and problem list.     ROS  No fevers or chills.  No chest pain or shortness of air.  No GI or  disturbances.    PHYSICAL EXAMINATION:       Ht 180.3 cm (71\")   Wt 107 kg (235 lb 11.2 oz)   BMI 32.87 kg/m²     Physical Exam  Constitutional:       General: He is not in acute distress.     Appearance: Normal appearance.   Pulmonary:      Effort: Pulmonary effort is normal. No respiratory distress.   Neurological:      Mental Status: He is alert and oriented to person, place, and time.         GAIT:     [x]  Normal  []  Antalgic    Assistive device: [x]  None  []  Walker     []  Crutches  []  Cane     []  Wheelchair  []  Stretcher    Left Elbow Exam     Comments:  No specific tenderness.  Mild symptoms with consistent ulnar compression test.  Tinel sign negative at the wrist.  Good range of motion, good wrist pulses and sensation, good muscle tone and strength.                                ASSESSMENT:    Diagnoses and all orders for this visit:    Left elbow pain    Left cervical radiculopathy    Numbness and tingling in left arm    Numbness and tingling of right arm    Essential hypertension          PLAN    Activity as tolerated  No restrictions  No surgical indication at this time  Discussed general strength and conditioning exercises  Continue cervical spine exercises.      Patient's Body mass index is 32.87 kg/m². BMI is above normal parameters. Recommendations include: exercise " counseling and nutrition counseling.  Return if symptoms worsen or fail to improve, for recheck.    Theodore Mckeon MD

## 2021-03-22 DIAGNOSIS — D50.8 IRON DEFICIENCY ANEMIA SECONDARY TO INADEQUATE DIETARY IRON INTAKE: Chronic | ICD-10-CM

## 2021-03-22 RX ORDER — IRON POLYSACCHARIDE COMPLEX 150 MG
150 CAPSULE ORAL DAILY
Qty: 90 CAPSULE | Refills: 3 | Status: SHIPPED | OUTPATIENT
Start: 2021-03-22 | End: 2022-03-25

## 2021-03-23 NOTE — PROGRESS NOTES
The ABCs of the Annual Wellness Visit  Subsequent Medicare Wellness Visit    Chief Complaint   Patient presents with   • Medicare Wellness-subsequent     sub medicare wellness    • Hypertension     6 month f/u labs     • Hyperlipidemia       Subjective   History of Present Illness:  Tao Cuadra is a 65 y.o. male who presents for a subsequent Medicare Wellness Visit.    HEALTH RISK ASSESSMENT    Recent Hospitalizations:  No hospitalization(s) within the last year.    Current Medical Providers:  Patient Care Team:  Juan Hodges MD as PCP - General (Family Medicine)  Wai Faust MD as Surgeon (Orthopedic Surgery)  Rudi Treadwell MD as Consulting Physician (Gastroenterology)    Smoking Status:  Social History     Tobacco Use   Smoking Status Former Smoker   • Quit date:    • Years since quittin.2   Smokeless Tobacco Never Used       Alcohol Consumption:  Social History     Substance and Sexual Activity   Alcohol Use No       Depression Screen:   PHQ-2/PHQ-9 Depression Screening 3/25/2021   Little interest or pleasure in doing things 0   Feeling down, depressed, or hopeless 0   Trouble falling or staying asleep, or sleeping too much -   Feeling tired or having little energy -   Poor appetite or overeating -   Feeling bad about yourself - or that you are a failure or have let yourself or your family down -   Trouble concentrating on things, such as reading the newspaper or watching television -   Moving or speaking so slowly that other people could have noticed. Or the opposite - being so fidgety or restless that you have been moving around a lot more than usual -   Thoughts that you would be better off dead, or of hurting yourself in some way -   Total Score 0   If you checked off any problems, how difficult have these problems made it for you to do your work, take care of things at home, or get along with other people? -       Fall Risk Screen:  STEADI Fall Risk Assessment was completed, and  patient is at LOW risk for falls.Assessment completed on:3/25/2021    Health Habits and Functional and Cognitive Screening:  Functional & Cognitive Status 3/25/2021   Do you have difficulty preparing food and eating? No   Do you have difficulty bathing yourself, getting dressed or grooming yourself? No   Do you have difficulty using the toilet? No   Do you have difficulty moving around from place to place? No   Do you have trouble with steps or getting out of a bed or a chair? No   Current Diet Well Balanced Diet   Dental Exam Up to date   Eye Exam Up to date   Exercise (times per week) 3 times per week   Current Exercise Activities Include Walking   Do you need help using the phone?  No   Are you deaf or do you have serious difficulty hearing?  No   Do you need help with transportation? No   Do you need help shopping? No   Do you need help preparing meals?  No   Do you need help with housework?  No   Do you need help with laundry? No   Do you need help taking your medications? No   Do you need help managing money? No   Do you ever drive or ride in a car without wearing a seat belt? No   Have you felt unusual stress, anger or loneliness in the last month? No   Who do you live with? Spouse   If you need help, do you have trouble finding someone available to you? No   Have you been bothered in the last four weeks by sexual problems? No   Do you have difficulty concentrating, remembering or making decisions? No         Does the patient have evidence of cognitive impairment? No    Asprin use counseling:Does not take aspirin    Age-appropriate Screening Schedule:  Refer to the list below for future screening recommendations based on patient's age, sex and/or medical conditions. Orders for these recommended tests are listed in the plan section. The patient has been provided with a written plan.    Health Maintenance   Topic Date Due   • TDAP/TD VACCINES (2 - Td) 04/28/2017   • ZOSTER VACCINE (2 of 2) 06/23/2017   •  DIABETIC FOOT EXAM  04/30/2019   • DIABETIC EYE EXAM  11/19/2020   • HEMOGLOBIN A1C  09/09/2021   • LIPID PANEL  03/09/2022   • URINE MICROALBUMIN  03/09/2022   • COLONOSCOPY  08/26/2029   • INFLUENZA VACCINE  Completed          The following portions of the patient's history were reviewed and updated as appropriate: allergies, current medications, past family history, past medical history, past social history, past surgical history and problem list.    Outpatient Medications Prior to Visit   Medication Sig Dispense Refill   • amLODIPine (NORVASC) 5 MG tablet TAKE 1 TABLET BY MOUTH DAILY. 90 tablet 1   • cetirizine (zyrTEC) 10 MG tablet Take 10 mg by mouth Daily.     • iron polysaccharides (Ferrex 150) 150 MG capsule Take 1 capsule by mouth Daily. 90 capsule 3   • Lancets 30G misc Check blood sugar every morning     • lisinopril-hydrochlorothiazide (PRINZIDE,ZESTORETIC) 20-25 MG per tablet TAKE 1 TABLET BY MOUTH DAILY. 90 tablet 1   • meloxicam (MOBIC) 15 MG tablet Take 1 tablet by mouth Daily. 30 tablet 5   • OneTouch Ultra test strip USE ONE STRIP TO CHECK GLUCOSE ONCE DAILY 100 each 1   • metFORMIN ER (GLUCOPHAGE-XR) 500 MG 24 hr tablet TAKE 2 TABLETS BY MOUTH 2 (TWO) TIMES A DAY. 360 tablet 0   • pravastatin (PRAVACHOL) 80 MG tablet Take 1 tablet by mouth Daily. 90 tablet 3     No facility-administered medications prior to visit.       Patient Active Problem List   Diagnosis   • Essential hypertension   • Mixed hyperlipidemia   • Type 2 diabetes mellitus without complication, without long-term current use of insulin (CMS/[facility])   • Iron deficiency anemia secondary to inadequate dietary iron intake   • Fracture, toe   • Partial traumatic amputation of great toe (CMS/HCC)   • Primary osteoarthritis involving multiple joints   • Left elbow pain       Advanced Care Planning:  ACP discussion was held with the patient during this visit. Patient does not have an advance directive, declines further assistance.    Review  "of Systems    Compared to one year ago, the patient feels his physical health is the same.  Compared to one year ago, the patient feels his mental health is the same.    Reviewed chart for potential of high risk medication in the elderly: yes  Reviewed chart for potential of harmful drug interactions in the elderly:yes    Objective         Vitals:    03/25/21 0956   BP: 122/76   Pulse: 74   Temp: 97.6 °F (36.4 °C)   TempSrc: Oral   SpO2: 99%   Weight: 105 kg (232 lb)   Height: 180.3 cm (71\")   PainSc: 0-No pain   PainLoc: Generalized       Body mass index is 32.36 kg/m².  Discussed the patient's BMI with him. The BMI is above average; BMI management plan is completed.    Physical Exam    Lab Results   Component Value Date    TRIG 146 03/09/2021    HDL 27 (L) 03/09/2021    LDL 92 03/09/2021    VLDL 26 03/09/2021    HGBA1C 6.70 (H) 03/09/2021        Assessment/Plan   Medicare Risks and Personalized Health Plan  CMS Preventative Services Quick Reference  Fall Risk  Immunizations Discussed/Encouraged (specific immunizations; Influenza, Pneumococcal 23 and Shingrix )  Obesity/Overweight   Prostate Cancer Screening    Recommend annual diabetic eye exam    The above risks/problems have been discussed with the patient.  Pertinent information has been shared with the patient in the After Visit Summary.  Follow up plans and orders are seen below in the Assessment/Plan Section.    Diagnoses and all orders for this visit:    1. Medicare annual wellness visit, subsequent (Primary)    2. Type 2 diabetes mellitus without complication, without long-term current use of insulin (CMS/[facility])  -     metFORMIN ER (GLUCOPHAGE-XR) 500 MG 24 hr tablet; Take 2 tablets by mouth 2 (Two) Times a Day.  Dispense: 360 tablet; Refill: 1    3. Essential hypertension    4. Mixed hyperlipidemia  -     pravastatin (PRAVACHOL) 80 MG tablet; Take 1 tablet by mouth Daily.  Dispense: 90 tablet; Refill: 3    5. Iron deficiency anemia secondary to inadequate " dietary iron intake    6. Primary osteoarthritis involving multiple joints      Follow Up:  Return in about 6 months (around 9/25/2021) for Next scheduled follow up, Or sooner as needed With Labs prior to appointment.     An After Visit Summary and PPPS were given to the patient.

## 2021-03-23 NOTE — PROGRESS NOTES
Chief Complaint   Patient presents with   • Medicare Wellness-subsequent     sub medicare wellness    • Hypertension     6 month f/u labs     • Hyperlipidemia     Subjective   Tao Cuadra is a 65 y.o. male who presents to the office for follow-up and review of labs.  He has diabetes and his blood sugar has been well controlled.  He has been compliant with a diabetic diet and medication.  He takes metformin for treatment of his diabetes.  He has hypertension and his blood pressure has been controlled.  He takes lisinopril HCT and amlodipine.  He has hyperlipidemia and takes pravastatin 80 mg daily.  He has iron deficiency anemia and takes a daily iron supplement.  He has osteoarthritis which affects his low back and knees.  He takes an over-the-counter NSAID as needed.    I recently referred him to orthopedics due to left elbow pain with numbness and tingling.  He was seen by Dr. Mckeon and subsequently referred to neurology for consult and EMG testing.  He reports that his pain has improved.  History of Present Illness has been reviewed and validated on 03/25/2021and updated with any changes.      The following portions of the patient's history were reviewed and updated as appropriate: allergies, current medications, past family history, past medical history, past social history, past surgical history and problem list.    Review of Systems   Constitutional: Negative for chills, fatigue and fever.   HENT: Negative for congestion, sneezing, sore throat and trouble swallowing.    Eyes: Negative for visual disturbance.   Respiratory: Negative for cough, chest tightness, shortness of breath and wheezing.    Cardiovascular: Negative for chest pain, palpitations and leg swelling.   Gastrointestinal: Negative for abdominal pain, constipation, diarrhea, nausea and vomiting.   Genitourinary: Negative for dysuria, frequency and urgency.   Musculoskeletal: Positive for arthralgias and back pain. Negative for neck pain.  "  Skin: Negative for rash.   Neurological: Negative for dizziness, weakness and headaches.   Psychiatric/Behavioral:        Patient denies any feelings of depression and has not felt down, hopeless or lost interest in any activities.   All other systems reviewed and are negative.  Review of systems has been reviewed and validated on 03/25/2021and updated with any changes.        Objective   Vitals:    03/25/21 0956   BP: 122/76   Pulse: 74   Temp: 97.6 °F (36.4 °C)   TempSrc: Oral   SpO2: 99%   Weight: 105 kg (232 lb)   Height: 180.3 cm (71\")   PainSc: 0-No pain   PainLoc: Generalized      Body mass index is 32.36 kg/m².    Physical Exam   Constitutional: He is oriented to person, place, and time. He appears well-developed. No distress.   HENT:   Head: Normocephalic and atraumatic.   Eyes: Pupils are equal, round, and reactive to light. Conjunctivae are normal. No scleral icterus.   Cardiovascular: Normal rate, regular rhythm and normal heart sounds. Exam reveals no gallop and no friction rub.   No murmur heard.  Pulmonary/Chest: Effort normal and breath sounds normal. No respiratory distress. He has no wheezes. He has no rales.   Musculoskeletal:      Lumbar back: He exhibits decreased range of motion.   Neurological: He is alert and oriented to person, place, and time. No cranial nerve deficit.   Skin: Skin is warm and dry. No rash noted.   Psychiatric: His behavior is normal. Judgment and thought content normal.   Nursing note and vitals reviewed.  Physical exam has been reviewed and validated on 03/25/2021and updated with any changes.        Assessment/Plan   Diagnoses and all orders for this visit:    1. Medicare annual wellness visit, subsequent (Primary)    2. Type 2 diabetes mellitus without complication, without long-term current use of insulin (CMS/[facility])  -     metFORMIN ER (GLUCOPHAGE-XR) 500 MG 24 hr tablet; Take 2 tablets by mouth 2 (Two) Times a Day.  Dispense: 360 tablet; Refill: 1    3. Essential " hypertension    4. Mixed hyperlipidemia  -     pravastatin (PRAVACHOL) 80 MG tablet; Take 1 tablet by mouth Daily.  Dispense: 90 tablet; Refill: 3    5. Iron deficiency anemia secondary to inadequate dietary iron intake    6. Primary osteoarthritis involving multiple joints         Labs are reviewed with patient.  His glucose was 125 with a hemoglobin A1c of 6.70.  His diabetes is controlled. He will continue with metformin.  He may monitor blood sugar one time daily.  His total cholesterol is 145, LDL 92 and triglycerides 146.  He will continue with pravastatin for treatment of hyperlipidemia.  He has mild anemia which is stable.  His hemoglobin is 12.2 with hematocrit of 37.3.  He will continue with the daily iron supplement.  His blood pressure is controlled, and he will continue with his current blood pressure medication.      Patient's Body mass index is 32.36 kg/m². BMI is above normal parameters. Recommendations include: educational material.    PHQ-2/PHQ-9 Depression Screening 3/25/2021   Little interest or pleasure in doing things 0   Feeling down, depressed, or hopeless 0   Trouble falling or staying asleep, or sleeping too much -   Feeling tired or having little energy -   Poor appetite or overeating -   Feeling bad about yourself - or that you are a failure or have let yourself or your family down -   Trouble concentrating on things, such as reading the newspaper or watching television -   Moving or speaking so slowly that other people could have noticed. Or the opposite - being so fidgety or restless that you have been moving around a lot more than usual -   Thoughts that you would be better off dead, or of hurting yourself in some way -   Total Score 0   If you checked off any problems, how difficult have these problems made it for you to do your work, take care of things at home, or get along with other people? -         Lab on 03/09/2021   Component Date Value Ref Range Status   • Glucose 03/09/2021  125* 70 - 99 mg/dL Final   • BUN 03/09/2021 15  7 - 23 mg/dL Final   • Creatinine 03/09/2021 0.90  0.70 - 1.30 mg/dL Final   • Sodium 03/09/2021 142  137 - 145 mmol/L Final   • Potassium 03/09/2021 3.6  3.4 - 5.0 mmol/L Final   • Chloride 03/09/2021 105  101 - 112 mmol/L Final   • CO2 03/09/2021 25.0  22.0 - 30.0 mmol/L Final   • Calcium 03/09/2021 9.5  8.4 - 10.2 mg/dL Final   • Total Protein 03/09/2021 8.1  6.3 - 8.6 g/dL Final   • Albumin 03/09/2021 4.50  3.50 - 5.00 g/dL Final   • ALT (SGPT) 03/09/2021 44  <=50 U/L Final   • AST (SGOT) 03/09/2021 36  17 - 59 U/L Final   • Alkaline Phosphatase 03/09/2021 81  38 - 126 U/L Final   • Total Bilirubin 03/09/2021 0.7  0.2 - 1.3 mg/dL Final   • eGFR   Amer 03/09/2021 102  49 - 113 mL/min/1.73 Final   • Globulin 03/09/2021 3.6* 2.3 - 3.5 gm/dL Final   • A/G Ratio 03/09/2021 1.3  1.1 - 1.8 g/dL Final   • BUN/Creatinine Ratio 03/09/2021 16.7  7.0 - 25.0 Final   • Anion Gap 03/09/2021 12.0  5.0 - 15.0 mmol/L Final   • Free T4 03/09/2021 1.65  0.93 - 1.70 ng/dL Final   • TSH 03/09/2021 0.036* 0.270 - 4.200 uIU/mL Final   • Hemoglobin A1C 03/09/2021 6.70* 4.80 - 5.60 % Final   • Total Cholesterol 03/09/2021 145* 150 - 200 mg/dL Final   • Triglycerides 03/09/2021 146  <=150 mg/dL Final   • HDL Cholesterol 03/09/2021 27* 40 - 59 mg/dL Final   • LDL Cholesterol  03/09/2021 92  <=100 mg/dL Final   • VLDL Cholesterol 03/09/2021 26  5 - 40 mg/dL Final   • LDL/HDL Ratio 03/09/2021 3.29  0.00 - 3.55 Final   • WBC 03/09/2021 7.22  3.40 - 10.80 10*3/mm3 Final   • RBC 03/09/2021 5.64  4.14 - 5.80 10*6/mm3 Final   • Hemoglobin 03/09/2021 12.2* 13.0 - 17.7 g/dL Final   • Hematocrit 03/09/2021 37.3* 37.5 - 51.0 % Final   • MCV 03/09/2021 66.1* 79.0 - 97.0 fL Final   • MCH 03/09/2021 21.6* 26.6 - 33.0 pg Final   • MCHC 03/09/2021 32.7  31.5 - 35.7 g/dL Final   • RDW 03/09/2021 16.2* 12.3 - 15.4 % Final   • RDW-SD 03/09/2021 37.7  37.0 - 54.0 fl Final   • MPV 03/09/2021 0.0* 6.0 -  12.0 fL Final   • Platelets 03/09/2021 137* 140 - 450 10*3/mm3 Final   • Scan Slide 03/09/2021    Final    See Manual Differential Results   • Neutrophil % 03/09/2021 46.0  42.7 - 76.0 % Final   • Lymphocyte % 03/09/2021 41.0  19.6 - 45.3 % Final   • Monocyte % 03/09/2021 9.0  5.0 - 12.0 % Final   • Eosinophil % 03/09/2021 1.0  0.3 - 6.2 % Final   • Bands %  03/09/2021 1.0  0.0 - 5.0 % Final   • Atypical Lymphocyte % 03/09/2021 2.0  0.0 - 5.0 % Final   • Neutrophils Absolute 03/09/2021 3.39  1.70 - 7.00 10*3/mm3 Final   • Lymphocytes Absolute 03/09/2021 2.96  0.70 - 3.10 10*3/mm3 Final   • Monocytes Absolute 03/09/2021 0.65  0.10 - 0.90 10*3/mm3 Final   • Eosinophils Absolute 03/09/2021 0.07  0.00 - 0.40 10*3/mm3 Final   • Anisocytosis 03/09/2021 Slight/1+  None Seen Final   • Microcytes 03/09/2021 Mod/2+  None Seen Final   • WBC Morphology 03/09/2021 Normal  Normal Final   • Platelet Estimate 03/09/2021 Decreased  Normal Final   • Large Platelets 03/09/2021 Slight/1+  None Seen Final   • Color, UA 03/09/2021 Yellow  Yellow, Straw Final   • Appearance, UA 03/09/2021 Slightly Cloudy* Clear Final   • pH, UA 03/09/2021 6.0  5.5 - 8.0 Final   • Specific Gravity, UA 03/09/2021 1.025  1.005 - 1.030 Final   • Glucose, UA 03/09/2021 Negative  Negative Final   • Ketones, UA 03/09/2021 Negative  Negative Final   • Bilirubin, UA 03/09/2021 Negative  Negative Final   • Blood, UA 03/09/2021 Negative  Negative Final   • Protein, UA 03/09/2021 Negative  Negative Final   • Leuk Esterase, UA 03/09/2021 Negative  Negative Final   • Nitrite, UA 03/09/2021 Negative  Negative Final   • Urobilinogen, UA 03/09/2021 0.2 E.U./dL  0.2 - 1.0 E.U./dL Final   • Microalbumin/Creatinine Ratio 03/09/2021 15.4  mg/g Final   • Creatinine, Urine 03/09/2021 161.9  mg/dL Final   • Microalbumin, Urine 03/09/2021 2.5  mg/dL Final   ]        .  .  .

## 2021-03-25 ENCOUNTER — OFFICE VISIT (OUTPATIENT)
Dept: FAMILY MEDICINE CLINIC | Facility: CLINIC | Age: 66
End: 2021-03-25

## 2021-03-25 VITALS
HEART RATE: 74 BPM | HEIGHT: 71 IN | TEMPERATURE: 97.6 F | OXYGEN SATURATION: 99 % | BODY MASS INDEX: 32.48 KG/M2 | DIASTOLIC BLOOD PRESSURE: 76 MMHG | SYSTOLIC BLOOD PRESSURE: 122 MMHG | WEIGHT: 232 LBS

## 2021-03-25 DIAGNOSIS — Z00.00 MEDICARE ANNUAL WELLNESS VISIT, SUBSEQUENT: Primary | ICD-10-CM

## 2021-03-25 DIAGNOSIS — E11.9 TYPE 2 DIABETES MELLITUS WITHOUT COMPLICATION, WITHOUT LONG-TERM CURRENT USE OF INSULIN (HCC): Chronic | ICD-10-CM

## 2021-03-25 DIAGNOSIS — D50.8 IRON DEFICIENCY ANEMIA SECONDARY TO INADEQUATE DIETARY IRON INTAKE: Chronic | ICD-10-CM

## 2021-03-25 DIAGNOSIS — I10 ESSENTIAL HYPERTENSION: Chronic | ICD-10-CM

## 2021-03-25 DIAGNOSIS — E78.2 MIXED HYPERLIPIDEMIA: Chronic | ICD-10-CM

## 2021-03-25 DIAGNOSIS — M15.9 PRIMARY OSTEOARTHRITIS INVOLVING MULTIPLE JOINTS: Chronic | ICD-10-CM

## 2021-03-25 PROCEDURE — G0439 PPPS, SUBSEQ VISIT: HCPCS | Performed by: INTERNAL MEDICINE

## 2021-03-25 RX ORDER — PRAVASTATIN SODIUM 80 MG/1
80 TABLET ORAL DAILY
Qty: 90 TABLET | Refills: 3 | Status: SHIPPED | OUTPATIENT
Start: 2021-03-25 | End: 2022-03-28 | Stop reason: SDUPTHER

## 2021-03-25 RX ORDER — METFORMIN HYDROCHLORIDE 500 MG/1
1000 TABLET, EXTENDED RELEASE ORAL 2 TIMES DAILY
Qty: 360 TABLET | Refills: 1 | Status: SHIPPED | OUTPATIENT
Start: 2021-03-25 | End: 2021-09-20

## 2021-03-25 NOTE — PATIENT INSTRUCTIONS

## 2021-05-03 DIAGNOSIS — E11.9 TYPE 2 DIABETES MELLITUS WITHOUT COMPLICATION, WITHOUT LONG-TERM CURRENT USE OF INSULIN (HCC): Primary | ICD-10-CM

## 2021-05-03 RX ORDER — BLOOD SUGAR DIAGNOSTIC
STRIP MISCELLANEOUS
Qty: 100 EACH | Refills: 1 | Status: SHIPPED | OUTPATIENT
Start: 2021-05-03 | End: 2021-11-15

## 2021-09-20 ENCOUNTER — LAB (OUTPATIENT)
Dept: LAB | Facility: OTHER | Age: 66
End: 2021-09-20

## 2021-09-20 DIAGNOSIS — Z12.5 SCREENING FOR PROSTATE CANCER: ICD-10-CM

## 2021-09-20 DIAGNOSIS — E11.9 TYPE 2 DIABETES MELLITUS WITHOUT COMPLICATION, WITHOUT LONG-TERM CURRENT USE OF INSULIN (HCC): Chronic | ICD-10-CM

## 2021-09-20 DIAGNOSIS — E78.2 MIXED HYPERLIPIDEMIA: Chronic | ICD-10-CM

## 2021-09-20 DIAGNOSIS — I10 ESSENTIAL HYPERTENSION: Chronic | ICD-10-CM

## 2021-09-20 DIAGNOSIS — D50.8 IRON DEFICIENCY ANEMIA SECONDARY TO INADEQUATE DIETARY IRON INTAKE: ICD-10-CM

## 2021-09-20 LAB
ALBUMIN SERPL-MCNC: 4.6 G/DL (ref 3.5–5)
ALBUMIN/GLOB SERPL: 1.4 G/DL (ref 1.1–1.8)
ALP SERPL-CCNC: 72 U/L (ref 38–126)
ALT SERPL W P-5'-P-CCNC: 35 U/L
ANION GAP SERPL CALCULATED.3IONS-SCNC: 9 MMOL/L (ref 5–15)
ARTICHOKE IGE QN: 101 MG/DL (ref 0–100)
AST SERPL-CCNC: 30 U/L (ref 17–59)
BILIRUB SERPL-MCNC: 0.8 MG/DL (ref 0.2–1.3)
BUN SERPL-MCNC: 16 MG/DL (ref 7–23)
BUN/CREAT SERPL: 14.4 (ref 7–25)
CALCIUM SPEC-SCNC: 9.9 MG/DL (ref 8.4–10.2)
CHLORIDE SERPL-SCNC: 105 MMOL/L (ref 101–112)
CO2 SERPL-SCNC: 25 MMOL/L (ref 22–30)
CREAT SERPL-MCNC: 1.11 MG/DL (ref 0.7–1.3)
DEPRECATED RDW RBC AUTO: 39.9 FL (ref 37–54)
ERYTHROCYTE [DISTWIDTH] IN BLOOD BY AUTOMATED COUNT: 18 % (ref 12.3–15.4)
GFR SERPL CREATININE-BSD FRML MDRD: 80 ML/MIN/1.73 (ref 49–113)
GLOBULIN UR ELPH-MCNC: 3.3 GM/DL (ref 2.3–3.5)
GLUCOSE SERPL-MCNC: 125 MG/DL (ref 70–99)
HCT VFR BLD AUTO: 37.2 % (ref 37.5–51)
HGB BLD-MCNC: 12.3 G/DL (ref 13–17.7)
HYPOCHROMIA BLD QL: ABNORMAL
LARGE PLATELETS: ABNORMAL
LYMPHOCYTES # BLD MANUAL: 3.99 10*3/MM3 (ref 0.7–3.1)
LYMPHOCYTES NFR BLD MANUAL: 46 % (ref 19.6–45.3)
LYMPHOCYTES NFR BLD MANUAL: 7 % (ref 5–12)
MCH RBC QN AUTO: 21.7 PG (ref 26.6–33)
MCHC RBC AUTO-ENTMCNC: 33.1 G/DL (ref 31.5–35.7)
MCV RBC AUTO: 65.5 FL (ref 79–97)
MICROCYTES BLD QL: ABNORMAL
MONOCYTES # BLD AUTO: 0.61 10*3/MM3 (ref 0.1–0.9)
NEUTROPHILS # BLD AUTO: 4.08 10*3/MM3 (ref 1.7–7)
NEUTROPHILS NFR BLD MANUAL: 47 % (ref 42.7–76)
PLATELET # BLD AUTO: 131 10*3/MM3 (ref 140–450)
PMV BLD AUTO: 0 FL (ref 6–12)
POTASSIUM SERPL-SCNC: 3.6 MMOL/L (ref 3.4–5)
PROT SERPL-MCNC: 7.9 G/DL (ref 6.3–8.6)
PSA SERPL-MCNC: 1.14 NG/ML (ref 0–4)
RBC # BLD AUTO: 5.68 10*6/MM3 (ref 4.14–5.8)
SCAN SLIDE: NORMAL
SMALL PLATELETS BLD QL SMEAR: ABNORMAL
SODIUM SERPL-SCNC: 139 MMOL/L (ref 137–145)
T4 FREE SERPL-MCNC: 1.24 NG/DL (ref 0.93–1.7)
TARGETS BLD QL SMEAR: ABNORMAL
TSH SERPL DL<=0.05 MIU/L-ACNC: 1.51 UIU/ML (ref 0.27–4.2)
WBC # BLD AUTO: 8.68 10*3/MM3 (ref 3.4–10.8)
WBC MORPH BLD: NORMAL

## 2021-09-20 PROCEDURE — 80053 COMPREHEN METABOLIC PANEL: CPT | Performed by: INTERNAL MEDICINE

## 2021-09-20 PROCEDURE — 83036 HEMOGLOBIN GLYCOSYLATED A1C: CPT | Performed by: INTERNAL MEDICINE

## 2021-09-20 PROCEDURE — G0103 PSA SCREENING: HCPCS | Performed by: INTERNAL MEDICINE

## 2021-09-20 PROCEDURE — 84439 ASSAY OF FREE THYROXINE: CPT | Performed by: INTERNAL MEDICINE

## 2021-09-20 PROCEDURE — 36415 COLL VENOUS BLD VENIPUNCTURE: CPT | Performed by: INTERNAL MEDICINE

## 2021-09-20 PROCEDURE — 85025 COMPLETE CBC W/AUTO DIFF WBC: CPT | Performed by: INTERNAL MEDICINE

## 2021-09-20 PROCEDURE — 84443 ASSAY THYROID STIM HORMONE: CPT | Performed by: INTERNAL MEDICINE

## 2021-09-20 PROCEDURE — 83721 ASSAY OF BLOOD LIPOPROTEIN: CPT | Performed by: INTERNAL MEDICINE

## 2021-09-20 RX ORDER — METFORMIN HYDROCHLORIDE 500 MG/1
1000 TABLET, EXTENDED RELEASE ORAL 2 TIMES DAILY
Qty: 360 TABLET | Refills: 1 | Status: SHIPPED | OUTPATIENT
Start: 2021-09-20 | End: 2022-03-28 | Stop reason: SDUPTHER

## 2021-09-20 RX ORDER — LISINOPRIL AND HYDROCHLOROTHIAZIDE 25; 20 MG/1; MG/1
1 TABLET ORAL DAILY
Qty: 90 TABLET | Refills: 1 | Status: SHIPPED | OUTPATIENT
Start: 2021-09-20 | End: 2022-03-28 | Stop reason: SDUPTHER

## 2021-09-21 LAB — HBA1C MFR BLD: 6.83 % (ref 4.8–5.6)

## 2021-09-27 ENCOUNTER — OFFICE VISIT (OUTPATIENT)
Dept: FAMILY MEDICINE CLINIC | Facility: CLINIC | Age: 66
End: 2021-09-27

## 2021-09-27 VITALS
HEIGHT: 71 IN | HEART RATE: 62 BPM | BODY MASS INDEX: 32.76 KG/M2 | SYSTOLIC BLOOD PRESSURE: 130 MMHG | OXYGEN SATURATION: 96 % | WEIGHT: 234 LBS | TEMPERATURE: 97.1 F | DIASTOLIC BLOOD PRESSURE: 80 MMHG

## 2021-09-27 DIAGNOSIS — E78.2 MIXED HYPERLIPIDEMIA: Chronic | ICD-10-CM

## 2021-09-27 DIAGNOSIS — E11.9 TYPE 2 DIABETES MELLITUS WITHOUT COMPLICATION, WITHOUT LONG-TERM CURRENT USE OF INSULIN (HCC): Primary | Chronic | ICD-10-CM

## 2021-09-27 DIAGNOSIS — M15.9 PRIMARY OSTEOARTHRITIS INVOLVING MULTIPLE JOINTS: Chronic | ICD-10-CM

## 2021-09-27 DIAGNOSIS — D50.8 IRON DEFICIENCY ANEMIA SECONDARY TO INADEQUATE DIETARY IRON INTAKE: Chronic | ICD-10-CM

## 2021-09-27 DIAGNOSIS — D69.6 THROMBOCYTOPENIA (HCC): Chronic | ICD-10-CM

## 2021-09-27 DIAGNOSIS — I10 ESSENTIAL HYPERTENSION: Chronic | ICD-10-CM

## 2021-09-27 PROCEDURE — 99214 OFFICE O/P EST MOD 30 MIN: CPT | Performed by: INTERNAL MEDICINE

## 2021-09-27 RX ORDER — AMLODIPINE BESYLATE 5 MG/1
5 TABLET ORAL DAILY
Qty: 90 TABLET | Refills: 1 | Status: SHIPPED | OUTPATIENT
Start: 2021-09-27 | End: 2022-03-28 | Stop reason: SDUPTHER

## 2021-09-27 NOTE — PROGRESS NOTES
Chief Complaint   Patient presents with   • Hypertension   • Hyperlipidemia   • Thrombocytopenia   • Diabetes   • Anemia   • Osteoarthritis     Subjective   Tao Cuadra is a 66 y.o. male who presents to the office for follow-up and review of labs.  He has diabetes and his blood sugar has has been doing well.  He has been compliant with a diabetic diet and medication.  He takes metformin ER, 1000 mg twice daily for treatment of his diabetes.  He has hypertension and his blood pressure has been controlled.  He takes lisinopril HCT 20/25 mg daily and amlodipine 5 mg daily.  He has hyperlipidemia and takes pravastatin 80 mg daily.  He has iron deficiency anemia and takes a daily iron supplement.  He has osteoarthritis which affects his low back and knees.  He takes an over-the-counter NSAID as needed.  He has thrombocytopenia, and his recent platelet level has been stable and baseline.    History of Present Illness has been reviewed and validated on 09/27/2021 and updated with any changes.      The following portions of the patient's history were reviewed and updated as appropriate: allergies, current medications, past family history, past medical history, past social history, past surgical history and problem list.    Review of Systems   Constitutional: Negative for chills, fatigue and fever.   HENT: Negative for congestion, sneezing, sore throat and trouble swallowing.    Eyes: Negative for visual disturbance.   Respiratory: Negative for cough, chest tightness, shortness of breath and wheezing.    Cardiovascular: Negative for chest pain, palpitations and leg swelling.   Gastrointestinal: Negative for abdominal pain, constipation, diarrhea, nausea and vomiting.   Genitourinary: Negative for dysuria, frequency and urgency.   Musculoskeletal: Positive for arthralgias and back pain. Negative for neck pain.   Skin: Negative for rash.   Neurological: Negative for dizziness, weakness and headaches.  "  Psychiatric/Behavioral:        Patient denies any feelings of depression and has not felt down, hopeless or lost interest in any activities.   All other systems reviewed and are negative.  Review of systems has been reviewed and validated on 09/27/2021 and updated with any changes.        Objective   Vitals:    09/27/21 1036   BP: 130/80   BP Location: Left arm   Patient Position: Sitting   Cuff Size: Large Adult   Pulse: 62  Comment: regular   Temp: 97.1 °F (36.2 °C)   TempSrc: Tympanic   SpO2: 96%   Weight: 106 kg (234 lb)   Height: 180.3 cm (71\")   PainSc: 0-No pain      Body mass index is 32.64 kg/m².    Physical Exam   Constitutional: He is oriented to person, place, and time. He appears well-developed. No distress.   HENT:   Head: Normocephalic and atraumatic.   Eyes: Pupils are equal, round, and reactive to light. Conjunctivae are normal. No scleral icterus.   Cardiovascular: Normal rate, regular rhythm and normal heart sounds. Exam reveals no gallop and no friction rub.   No murmur heard.  Pulmonary/Chest: Effort normal and breath sounds normal. No respiratory distress. He has no wheezes. He has no rales.   Musculoskeletal:      Lumbar back: He exhibits decreased range of motion.   Neurological: He is alert and oriented to person, place, and time. No cranial nerve deficit.   Skin: Skin is warm and dry. No rash noted.   Psychiatric: His behavior is normal. Judgment and thought content normal.   Nursing note and vitals reviewed.  Physical exam has been reviewed and validated on 09/27/2021 and updated with any changes.        Assessment/Plan   Diagnoses and all orders for this visit:    1. Type 2 diabetes mellitus without complication, without long-term current use of insulin (CMS/[facility]) (Primary)  -     Hemoglobin A1c; Future  -     Microalbumin / Creatinine Urine Ratio - Urine, Clean Catch; Future    2. Essential hypertension  -     Comprehensive Metabolic Panel; Future  -     T4, Free; Future  -     TSH; " Future  -     Urinalysis With Culture If Indicated -; Future  -     amLODIPine (NORVASC) 5 MG tablet; Take 1 tablet by mouth Daily.  Dispense: 90 tablet; Refill: 1    3. Mixed hyperlipidemia  -     Lipid Panel; Future    4. Iron deficiency anemia secondary to inadequate dietary iron intake  -     CBC & Differential; Future    5. Primary osteoarthritis involving multiple joints    6. Thrombocytopenia (HCC)         Labs are reviewed with patient.  His glucose is 125 with a hemoglobin A1c of 6.83.  His diabetes is controlled. He will continue with metformin.  He may monitor blood sugar one time daily.  His LDL is 101.  He will continue with pravastatin 80 mg nightly for treatment of hyperlipidemia.  He has mild anemia which is stable.  His hemoglobin is 12.3 with hematocrit of 37.2.  He will continue with the daily iron supplement.  His platelets are decreased but stable/baseline at 131.  His blood pressure is controlled, and he will continue with his current blood pressure medication.  His PSA is normal at 1.14.      PHQ-2/PHQ-9 Depression Screening 9/27/2021   Little interest or pleasure in doing things 0   Feeling down, depressed, or hopeless 0   Trouble falling or staying asleep, or sleeping too much -   Feeling tired or having little energy -   Poor appetite or overeating -   Feeling bad about yourself - or that you are a failure or have let yourself or your family down -   Trouble concentrating on things, such as reading the newspaper or watching television -   Moving or speaking so slowly that other people could have noticed. Or the opposite - being so fidgety or restless that you have been moving around a lot more than usual -   Thoughts that you would be better off dead, or of hurting yourself in some way -   Total Score 0   If you checked off any problems, how difficult have these problems made it for you to do your work, take care of things at home, or get along with other people? -         Lab on 09/20/2021    Component Date Value Ref Range Status   • Glucose 09/20/2021 125* 70 - 99 mg/dL Final   • BUN 09/20/2021 16  7 - 23 mg/dL Final   • Creatinine 09/20/2021 1.11  0.70 - 1.30 mg/dL Final   • Sodium 09/20/2021 139  137 - 145 mmol/L Final   • Potassium 09/20/2021 3.6  3.4 - 5.0 mmol/L Final   • Chloride 09/20/2021 105  101 - 112 mmol/L Final   • CO2 09/20/2021 25.0  22.0 - 30.0 mmol/L Final   • Calcium 09/20/2021 9.9  8.4 - 10.2 mg/dL Final   • Total Protein 09/20/2021 7.9  6.3 - 8.6 g/dL Final   • Albumin 09/20/2021 4.60  3.50 - 5.00 g/dL Final   • ALT (SGPT) 09/20/2021 35  <=50 U/L Final   • AST (SGOT) 09/20/2021 30  17 - 59 U/L Final   • Alkaline Phosphatase 09/20/2021 72  38 - 126 U/L Final   • Total Bilirubin 09/20/2021 0.8  0.2 - 1.3 mg/dL Final   • eGFR   Amer 09/20/2021 80  49 - 113 mL/min/1.73 Final   • Globulin 09/20/2021 3.3  2.3 - 3.5 gm/dL Final   • A/G Ratio 09/20/2021 1.4  1.1 - 1.8 g/dL Final   • BUN/Creatinine Ratio 09/20/2021 14.4  7.0 - 25.0 Final   • Anion Gap 09/20/2021 9.0  5.0 - 15.0 mmol/L Final   • Free T4 09/20/2021 1.24  0.93 - 1.70 ng/dL Final   • TSH 09/20/2021 1.510  0.270 - 4.200 uIU/mL Final   • Hemoglobin A1C 09/20/2021 6.83* 4.80 - 5.60 % Final   • LDL Cholesterol  09/20/2021 101* 0 - 100 mg/dL Final   • PSA 09/20/2021 1.140  0.000 - 4.000 ng/mL Final   • WBC 09/20/2021 8.68  3.40 - 10.80 10*3/mm3 Final   • RBC 09/20/2021 5.68  4.14 - 5.80 10*6/mm3 Final   • Hemoglobin 09/20/2021 12.3* 13.0 - 17.7 g/dL Final   • Hematocrit 09/20/2021 37.2* 37.5 - 51.0 % Final   • MCV 09/20/2021 65.5* 79.0 - 97.0 fL Final   • MCH 09/20/2021 21.7* 26.6 - 33.0 pg Final   • MCHC 09/20/2021 33.1  31.5 - 35.7 g/dL Final   • RDW 09/20/2021 18.0* 12.3 - 15.4 % Final   • RDW-SD 09/20/2021 39.9  37.0 - 54.0 fl Final   • MPV 09/20/2021 0.0* 6.0 - 12.0 fL Final   • Platelets 09/20/2021 131* 140 - 450 10*3/mm3 Final   • Scan Slide 09/20/2021    Final    See Manual Differential Results   • Neutrophil %  09/20/2021 47.0  42.7 - 76.0 % Final   • Lymphocyte % 09/20/2021 46.0* 19.6 - 45.3 % Final   • Monocyte % 09/20/2021 7.0  5.0 - 12.0 % Final   • Neutrophils Absolute 09/20/2021 4.08  1.70 - 7.00 10*3/mm3 Final   • Lymphocytes Absolute 09/20/2021 3.99* 0.70 - 3.10 10*3/mm3 Final   • Monocytes Absolute 09/20/2021 0.61  0.10 - 0.90 10*3/mm3 Final   • Hypochromia 09/20/2021 Slight/1+  None Seen Final   • Microcytes 09/20/2021 Slight/1+  None Seen Final   • Target Cells 09/20/2021 Slight/1+  None Seen Final   • WBC Morphology 09/20/2021 Normal  Normal Final   • Platelet Estimate 09/20/2021 Decreased  Normal Final   • Large Platelets 09/20/2021 Slight/1+  None Seen Final   ]        .  .  .

## 2021-11-15 DIAGNOSIS — E11.9 TYPE 2 DIABETES MELLITUS WITHOUT COMPLICATION, WITHOUT LONG-TERM CURRENT USE OF INSULIN (HCC): ICD-10-CM

## 2021-11-15 RX ORDER — BLOOD SUGAR DIAGNOSTIC
STRIP MISCELLANEOUS
Qty: 100 EACH | Refills: 1 | Status: SHIPPED | OUTPATIENT
Start: 2021-11-15 | End: 2022-06-01

## 2022-01-27 ENCOUNTER — LAB (OUTPATIENT)
Dept: LAB | Facility: OTHER | Age: 67
End: 2022-01-27

## 2022-01-27 PROCEDURE — U0003 INFECTIOUS AGENT DETECTION BY NUCLEIC ACID (DNA OR RNA); SEVERE ACUTE RESPIRATORY SYNDROME CORONAVIRUS 2 (SARS-COV-2) (CORONAVIRUS DISEASE [COVID-19]), AMPLIFIED PROBE TECHNIQUE, MAKING USE OF HIGH THROUGHPUT TECHNOLOGIES AS DESCRIBED BY CMS-2020-01-R: HCPCS | Performed by: PHYSICIAN ASSISTANT

## 2022-03-22 ENCOUNTER — TELEPHONE (OUTPATIENT)
Dept: FAMILY MEDICINE CLINIC | Facility: CLINIC | Age: 67
End: 2022-03-22

## 2022-03-22 NOTE — TELEPHONE ENCOUNTER
The patient tried to complete his fasting labs today and was denied entrance due to a lingering cough.   He is currently on antibiotics for a URI, last seen 3/12/22. I advised the patient to come back on Thursday for his fasting labs. He voiced understanding.

## 2022-03-24 ENCOUNTER — LAB (OUTPATIENT)
Dept: LAB | Facility: OTHER | Age: 67
End: 2022-03-24

## 2022-03-24 DIAGNOSIS — I10 ESSENTIAL HYPERTENSION: Chronic | ICD-10-CM

## 2022-03-24 DIAGNOSIS — E11.9 TYPE 2 DIABETES MELLITUS WITHOUT COMPLICATION, WITHOUT LONG-TERM CURRENT USE OF INSULIN: Chronic | ICD-10-CM

## 2022-03-24 DIAGNOSIS — D50.8 IRON DEFICIENCY ANEMIA SECONDARY TO INADEQUATE DIETARY IRON INTAKE: ICD-10-CM

## 2022-03-24 DIAGNOSIS — E78.2 MIXED HYPERLIPIDEMIA: Chronic | ICD-10-CM

## 2022-03-24 LAB
ALBUMIN SERPL-MCNC: 4.6 G/DL (ref 3.5–5)
ALBUMIN UR-MCNC: <1.2 MG/DL
ALBUMIN/GLOB SERPL: 1.3 G/DL (ref 1.1–1.8)
ALP SERPL-CCNC: 87 U/L (ref 38–126)
ALT SERPL W P-5'-P-CCNC: 44 U/L
ANION GAP SERPL CALCULATED.3IONS-SCNC: 11 MMOL/L (ref 5–15)
AST SERPL-CCNC: 33 U/L (ref 17–59)
BILIRUB SERPL-MCNC: 0.8 MG/DL (ref 0.2–1.3)
BILIRUB UR QL STRIP: NEGATIVE
BUN SERPL-MCNC: 15 MG/DL (ref 7–23)
BUN/CREAT SERPL: 18.5 (ref 7–25)
CALCIUM SPEC-SCNC: 9.9 MG/DL (ref 8.4–10.2)
CHLORIDE SERPL-SCNC: 104 MMOL/L (ref 101–112)
CHOLEST SERPL-MCNC: 153 MG/DL (ref 150–200)
CLARITY UR: CLEAR
CO2 SERPL-SCNC: 25 MMOL/L (ref 22–30)
COLOR UR: YELLOW
CREAT SERPL-MCNC: 0.81 MG/DL (ref 0.7–1.3)
CREAT UR-MCNC: 94.8 MG/DL
DEPRECATED RDW RBC AUTO: 41.3 FL (ref 37–54)
EGFRCR SERPLBLD CKD-EPI 2021: 96.6 ML/MIN/1.73
EOSINOPHIL # BLD MANUAL: 0.1 10*3/MM3 (ref 0–0.4)
EOSINOPHIL NFR BLD MANUAL: 1 % (ref 0.3–6.2)
ERYTHROCYTE [DISTWIDTH] IN BLOOD BY AUTOMATED COUNT: 18 % (ref 12.3–15.4)
GLOBULIN UR ELPH-MCNC: 3.5 GM/DL (ref 2.3–3.5)
GLUCOSE SERPL-MCNC: 140 MG/DL (ref 70–99)
GLUCOSE UR STRIP-MCNC: NEGATIVE MG/DL
HBA1C MFR BLD: 7.8 % (ref 4.8–5.6)
HCT VFR BLD AUTO: 36.8 % (ref 37.5–51)
HDLC SERPL-MCNC: 31 MG/DL (ref 40–59)
HGB BLD-MCNC: 12.2 G/DL (ref 13–17.7)
HGB UR QL STRIP.AUTO: NEGATIVE
HYPOCHROMIA BLD QL: ABNORMAL
KETONES UR QL STRIP: NEGATIVE
LARGE PLATELETS: ABNORMAL
LDLC SERPL CALC-MCNC: 92 MG/DL
LDLC/HDLC SERPL: 2.81 {RATIO} (ref 0–3.55)
LEUKOCYTE ESTERASE UR QL STRIP.AUTO: NEGATIVE
LYMPHOCYTES # BLD MANUAL: 4.7 10*3/MM3 (ref 0.7–3.1)
LYMPHOCYTES NFR BLD MANUAL: 7 % (ref 5–12)
MCH RBC QN AUTO: 21.9 PG (ref 26.6–33)
MCHC RBC AUTO-ENTMCNC: 33.2 G/DL (ref 31.5–35.7)
MCV RBC AUTO: 65.9 FL (ref 79–97)
MICROALBUMIN/CREAT UR: NORMAL MG/G{CREAT}
MICROCYTES BLD QL: ABNORMAL
MONOCYTES # BLD: 0.73 10*3/MM3 (ref 0.1–0.9)
NEUTROPHILS # BLD AUTO: 4.91 10*3/MM3 (ref 1.7–7)
NEUTROPHILS NFR BLD MANUAL: 47 % (ref 42.7–76)
NITRITE UR QL STRIP: NEGATIVE
PH UR STRIP.AUTO: 6.5 [PH] (ref 5.5–8)
PLATELET # BLD AUTO: 130 10*3/MM3 (ref 140–450)
PMV BLD AUTO: 0 FL (ref 6–12)
POTASSIUM SERPL-SCNC: 3.7 MMOL/L (ref 3.4–5)
PROT SERPL-MCNC: 8.1 G/DL (ref 6.3–8.6)
PROT UR QL STRIP: NEGATIVE
RBC # BLD AUTO: 5.58 10*6/MM3 (ref 4.14–5.8)
SCAN SLIDE: NORMAL
SMALL PLATELETS BLD QL SMEAR: ABNORMAL
SODIUM SERPL-SCNC: 140 MMOL/L (ref 137–145)
SP GR UR STRIP: 1.02 (ref 1–1.03)
T4 FREE SERPL-MCNC: 1.39 NG/DL (ref 0.93–1.7)
TRIGL SERPL-MCNC: 174 MG/DL
TSH SERPL DL<=0.05 MIU/L-ACNC: 1.93 UIU/ML (ref 0.27–4.2)
UROBILINOGEN UR QL STRIP: NORMAL
VARIANT LYMPHS NFR BLD MANUAL: 45 % (ref 19.6–45.3)
VLDLC SERPL-MCNC: 30 MG/DL (ref 5–40)
WBC MORPH BLD: NORMAL
WBC NRBC COR # BLD: 10.45 10*3/MM3 (ref 3.4–10.8)

## 2022-03-24 PROCEDURE — 82043 UR ALBUMIN QUANTITATIVE: CPT | Performed by: INTERNAL MEDICINE

## 2022-03-24 PROCEDURE — 80061 LIPID PANEL: CPT | Performed by: INTERNAL MEDICINE

## 2022-03-24 PROCEDURE — 84439 ASSAY OF FREE THYROXINE: CPT | Performed by: INTERNAL MEDICINE

## 2022-03-24 PROCEDURE — 83036 HEMOGLOBIN GLYCOSYLATED A1C: CPT | Performed by: INTERNAL MEDICINE

## 2022-03-24 PROCEDURE — 82570 ASSAY OF URINE CREATININE: CPT | Performed by: INTERNAL MEDICINE

## 2022-03-24 PROCEDURE — 80053 COMPREHEN METABOLIC PANEL: CPT | Performed by: INTERNAL MEDICINE

## 2022-03-24 PROCEDURE — 84443 ASSAY THYROID STIM HORMONE: CPT | Performed by: INTERNAL MEDICINE

## 2022-03-24 PROCEDURE — 81003 URINALYSIS AUTO W/O SCOPE: CPT | Performed by: INTERNAL MEDICINE

## 2022-03-24 PROCEDURE — 85025 COMPLETE CBC W/AUTO DIFF WBC: CPT | Performed by: INTERNAL MEDICINE

## 2022-03-24 PROCEDURE — 36415 COLL VENOUS BLD VENIPUNCTURE: CPT | Performed by: INTERNAL MEDICINE

## 2022-03-25 DIAGNOSIS — D50.8 IRON DEFICIENCY ANEMIA SECONDARY TO INADEQUATE DIETARY IRON INTAKE: Chronic | ICD-10-CM

## 2022-03-25 RX ORDER — IRON POLYSACCHARIDE COMPLEX 150 MG
CAPSULE ORAL
Qty: 90 CAPSULE | Refills: 3 | Status: SHIPPED | OUTPATIENT
Start: 2022-03-25 | End: 2023-01-03

## 2022-03-28 ENCOUNTER — OFFICE VISIT (OUTPATIENT)
Dept: FAMILY MEDICINE CLINIC | Facility: CLINIC | Age: 67
End: 2022-03-28

## 2022-03-28 VITALS
OXYGEN SATURATION: 98 % | DIASTOLIC BLOOD PRESSURE: 76 MMHG | HEART RATE: 80 BPM | HEIGHT: 71 IN | SYSTOLIC BLOOD PRESSURE: 138 MMHG | WEIGHT: 239.8 LBS | BODY MASS INDEX: 33.57 KG/M2 | TEMPERATURE: 97.5 F

## 2022-03-28 DIAGNOSIS — Z00.00 MEDICARE ANNUAL WELLNESS VISIT, SUBSEQUENT: Primary | ICD-10-CM

## 2022-03-28 DIAGNOSIS — E78.2 MIXED HYPERLIPIDEMIA: Chronic | ICD-10-CM

## 2022-03-28 DIAGNOSIS — D69.6 THROMBOCYTOPENIA: Chronic | ICD-10-CM

## 2022-03-28 DIAGNOSIS — Z12.5 SCREENING FOR PROSTATE CANCER: ICD-10-CM

## 2022-03-28 DIAGNOSIS — I10 ESSENTIAL HYPERTENSION: Chronic | ICD-10-CM

## 2022-03-28 DIAGNOSIS — D50.8 IRON DEFICIENCY ANEMIA SECONDARY TO INADEQUATE DIETARY IRON INTAKE: Chronic | ICD-10-CM

## 2022-03-28 DIAGNOSIS — E11.9 TYPE 2 DIABETES MELLITUS WITHOUT COMPLICATION, WITHOUT LONG-TERM CURRENT USE OF INSULIN: Chronic | ICD-10-CM

## 2022-03-28 DIAGNOSIS — M15.9 PRIMARY OSTEOARTHRITIS INVOLVING MULTIPLE JOINTS: Chronic | ICD-10-CM

## 2022-03-28 PROCEDURE — G0439 PPPS, SUBSEQ VISIT: HCPCS | Performed by: INTERNAL MEDICINE

## 2022-03-28 PROCEDURE — 1159F MED LIST DOCD IN RCRD: CPT | Performed by: INTERNAL MEDICINE

## 2022-03-28 RX ORDER — LISINOPRIL AND HYDROCHLOROTHIAZIDE 25; 20 MG/1; MG/1
1 TABLET ORAL DAILY
Qty: 90 TABLET | Refills: 1 | Status: SHIPPED | OUTPATIENT
Start: 2022-03-28 | End: 2022-09-02 | Stop reason: SDUPTHER

## 2022-03-28 RX ORDER — PRAVASTATIN SODIUM 80 MG/1
80 TABLET ORAL DAILY
Qty: 90 TABLET | Refills: 3 | Status: SHIPPED | OUTPATIENT
Start: 2022-03-28 | End: 2023-03-31 | Stop reason: SDUPTHER

## 2022-03-28 RX ORDER — METFORMIN HYDROCHLORIDE 500 MG/1
1000 TABLET, EXTENDED RELEASE ORAL 2 TIMES DAILY
Qty: 360 TABLET | Refills: 1 | Status: SHIPPED | OUTPATIENT
Start: 2022-03-28 | End: 2022-09-02

## 2022-03-28 RX ORDER — AMLODIPINE BESYLATE 5 MG/1
5 TABLET ORAL DAILY
Qty: 90 TABLET | Refills: 1 | Status: SHIPPED | OUTPATIENT
Start: 2022-03-28 | End: 2022-09-02 | Stop reason: SDUPTHER

## 2022-03-28 NOTE — PROGRESS NOTES
The ABCs of the Annual Wellness Visit  Subsequent Medicare Wellness Visit    Chief Complaint   Patient presents with   • Hyperlipidemia   • Hypertension   • Medicare Wellness-subsequent   • Type 2 diabetes mellitus without complication, without long   • Iron deficiency anemia secondary to inadequate dietary iron   • Primary osteoarthritis involving multiple joints   • Thrombocytopenia (HCC)       Subjective   History of Present Illness:  Tao Cuadra is a 66 y.o. male who presents for a subsequent Medicare Wellness Visit.    HEALTH RISK ASSESSMENT    Recent Hospitalizations:  No hospitalization(s) within the last year.    Current Medical Providers:  Patient Care Team:  Juan Hodges MD as PCP - General (Family Medicine)  Wai Faust MD as Surgeon (Orthopedic Surgery)  Rudi Treadwell MD as Consulting Physician (Gastroenterology)    Smoking Status:  Social History     Tobacco Use   Smoking Status Former Smoker   • Quit date: 2007   • Years since quitting: 15.2   Smokeless Tobacco Never Used       Alcohol Consumption:  Social History     Substance and Sexual Activity   Alcohol Use No       Depression Screen:   PHQ-2/PHQ-9 Depression Screening 3/28/2022   Retired PHQ-9 Total Score -   Retired Total Score -   Little Interest or Pleasure in Doing Things 0-->not at all   Feeling Down, Depressed or Hopeless 0-->not at all   PHQ-9: Brief Depression Severity Measure Score 0       Fall Risk Screen:  POLO Fall Risk Assessment was completed, and patient is at LOW risk for falls.Assessment completed on:3/28/2022    Health Habits and Functional and Cognitive Screening:  Functional & Cognitive Status 3/28/2022   Do you have difficulty preparing food and eating? No   Do you have difficulty bathing yourself, getting dressed or grooming yourself? No   Do you have difficulty using the toilet? No   Do you have difficulty moving around from place to place? No   Do you have trouble with steps or getting out of a bed or  a chair? No   Current Diet Well Balanced Diet   Dental Exam Up to date   Eye Exam Up to date   Exercise (times per week) 4 times per week   Current Exercises Include Walking;Yard Work   Current Exercise Activities Include -   Do you need help using the phone?  No   Are you deaf or do you have serious difficulty hearing?  No   Do you need help with transportation? No   Do you need help shopping? No   Do you need help preparing meals?  No   Do you need help with housework?  No   Do you need help with laundry? No   Do you need help taking your medications? No   Do you need help managing money? No   Do you ever drive or ride in a car without wearing a seat belt? No   Have you felt unusual stress, anger or loneliness in the last month? No   Who do you live with? Spouse   If you need help, do you have trouble finding someone available to you? No   Have you been bothered in the last four weeks by sexual problems? No   Do you have difficulty concentrating, remembering or making decisions? No         Does the patient have evidence of cognitive impairment? No    Asprin use counseling:Does not take aspirin    Age-appropriate Screening Schedule:  Refer to the list below for future screening recommendations based on patient's age, sex and/or medical conditions. Orders for these recommended tests are listed in the plan section. The patient has been provided with a written plan.    Health Maintenance   Topic Date Due   • TDAP/TD VACCINES (2 - Td or Tdap) 04/28/2017   • ZOSTER VACCINE (2 of 2) 06/23/2017   • DIABETIC FOOT EXAM  04/30/2019   • DIABETIC EYE EXAM  11/19/2020   • HEMOGLOBIN A1C  09/24/2022   • LIPID PANEL  03/24/2023   • URINE MICROALBUMIN  03/24/2023   • INFLUENZA VACCINE  Completed          The following portions of the patient's history were reviewed and updated as appropriate: allergies, current medications, past family history, past medical history, past social history, past surgical history and problem  list.    Outpatient Medications Prior to Visit   Medication Sig Dispense Refill   • cetirizine (zyrTEC) 10 MG tablet Take 10 mg by mouth Daily.     • Lancets 30G misc Check blood sugar every morning     • OneTouch Ultra test strip USE ONE STRIP TO CHECK GLUCOSE ONCE DAILY 100 each 1   • Poly-Iron 150 150 MG capsule TAKE 1 CAPSULE BY MOUTH DAILY. 90 capsule 3   • amLODIPine (NORVASC) 5 MG tablet Take 1 tablet by mouth Daily. 90 tablet 1   • amoxicillin (AMOXIL) 500 MG capsule Take 1 capsule by mouth 3 (Three) Times a Day. 30 capsule 0   • lisinopril-hydrochlorothiazide (PRINZIDE,ZESTORETIC) 20-25 MG per tablet TAKE 1 TABLET BY MOUTH DAILY 90 tablet 1   • metFORMIN ER (GLUCOPHAGE-XR) 500 MG 24 hr tablet TAKE 2 TABLETS BY MOUTH 2 (TWO) TIMES A DAY. 360 tablet 1   • pravastatin (PRAVACHOL) 80 MG tablet Take 1 tablet by mouth Daily. 90 tablet 3   • predniSONE (DELTASONE) 10 MG tablet Take 1 tablet by mouth Daily. 6 tablet 0   • benzonatate (TESSALON) 200 MG capsule Take 1 capsule by mouth 3 (Three) Times a Day As Needed for Cough. 30 capsule 0   • gentamicin (GARAMYCIN) 0.3 % ophthalmic ointment 2 (Two) Times a Day.     • meloxicam (MOBIC) 15 MG tablet Take 1 tablet by mouth Daily. 30 tablet 5     No facility-administered medications prior to visit.       Patient Active Problem List   Diagnosis   • Essential hypertension   • Mixed hyperlipidemia   • Type 2 diabetes mellitus without complication, without long-term current use of insulin (HCC)   • Iron deficiency anemia secondary to inadequate dietary iron intake   • Fracture, toe   • Partial traumatic amputation of great toe (HCC)   • Primary osteoarthritis involving multiple joints   • Left elbow pain   • Thrombocytopenia (HCC)       Advanced Care Planning:  ACP discussion was held with the patient during this visit. Patient does not have an advance directive, declines further assistance.    Review of Systems    Compared to one year ago, the patient feels his physical  "health is the same.  Compared to one year ago, the patient feels his mental health is the same.    Reviewed chart for potential of high risk medication in the elderly: yes  Reviewed chart for potential of harmful drug interactions in the elderly:yes    Objective         Vitals:    03/28/22 0931   BP: 138/76   BP Location: Left arm   Patient Position: Sitting   Cuff Size: Large Adult   Pulse: 80   Temp: 97.5 °F (36.4 °C)   TempSrc: Tympanic   SpO2: 98%   Weight: 109 kg (239 lb 12.8 oz)   Height: 180.3 cm (70.98\")   PainSc: 0-No pain       Body mass index is 33.46 kg/m².  Discussed the patient's BMI with him. The BMI is above average; BMI management plan is completed.  Patient's Body mass index is 33.46 kg/m². indicating that he is obese (BMI >30). Obesity-related health conditions include the following: hypertension, diabetes mellitus and dyslipidemias. Obesity is unchanged. BMI is is above average; BMI management plan is completed. We discussed portion control and increasing exercise.    Physical Exam    Lab Results   Component Value Date    TRIG 174 (H) 03/24/2022    HDL 31 (L) 03/24/2022    LDL 92 03/24/2022    VLDL 30 03/24/2022    HGBA1C 7.80 (H) 03/24/2022        Assessment/Plan   Medicare Risks and Personalized Health Plan  CMS Preventative Services Quick Reference  Fall Risk  Immunizations Discussed/Encouraged (specific immunizations; Influenza, Pneumococcal 23 and Shingrix )  Obesity/Overweight   Prostate Cancer Screening    Recommend annual diabetic eye exam    The above risks/problems have been discussed with the patient.  Pertinent information has been shared with the patient in the After Visit Summary.  Follow up plans and orders are seen below in the Assessment/Plan Section.    Diagnoses and all orders for this visit:    1. Medicare annual wellness visit, subsequent (Primary)    2. Type 2 diabetes mellitus without complication, without long-term current use of insulin (HCC)  -     Hemoglobin A1c; " Future  -     metFORMIN ER (GLUCOPHAGE-XR) 500 MG 24 hr tablet; Take 2 tablets by mouth 2 (Two) Times a Day.  Dispense: 360 tablet; Refill: 1    3. Essential hypertension  -     Comprehensive Metabolic Panel; Future  -     T4, Free; Future  -     TSH; Future  -     Urinalysis With Culture If Indicated -; Future  -     amLODIPine (NORVASC) 5 MG tablet; Take 1 tablet by mouth Daily.  Dispense: 90 tablet; Refill: 1  -     lisinopril-hydrochlorothiazide (PRINZIDE,ZESTORETIC) 20-25 MG per tablet; Take 1 tablet by mouth Daily.  Dispense: 90 tablet; Refill: 1    4. Mixed hyperlipidemia  -     LDL Cholesterol, Direct; Future  -     pravastatin (PRAVACHOL) 80 MG tablet; Take 1 tablet by mouth Daily.  Dispense: 90 tablet; Refill: 3    5. Iron deficiency anemia secondary to inadequate dietary iron intake  -     CBC & Differential; Future    6. Thrombocytopenia (HCC)    7. Primary osteoarthritis involving multiple joints    8. Screening for prostate cancer  -     PSA Screen; Future      Follow Up:  Return in about 6 months (around 9/28/2022) for Next scheduled follow up, Or sooner as needed With Labs prior to appointment.     An After Visit Summary and PPPS were given to the patient.

## 2022-03-28 NOTE — PROGRESS NOTES
Chief Complaint   Patient presents with   • Hyperlipidemia   • Hypertension   • Medicare Wellness-subsequent   • Type 2 diabetes mellitus without complication, without long   • Iron deficiency anemia secondary to inadequate dietary iron   • Primary osteoarthritis involving multiple joints   • Thrombocytopenia (HCC)     Subjective   Tao Cuadra is a 66 y.o. male who presents to the office for follow-up and review of labs.  He has diabetes and his blood sugar has has been running high at times.  He has not been as compliant with a diabetic diet, but has been taking his medication appropriately.  He takes metformin ER, 1000 mg twice daily for treatment of his diabetes.  He has hypertension and his blood pressure has been controlled.  He takes lisinopril HCT 20/25 mg daily and amlodipine 5 mg daily.  He has hyperlipidemia and takes pravastatin 80 mg daily.  He has iron deficiency anemia and takes a daily iron supplement.  He has osteoarthritis which affects his low back and knees.  He takes an over-the-counter NSAID as needed.  He has thrombocytopenia, and his recent platelet level has been stable and baseline.    History of Present Illness has been reviewed and validated on 03/28/2022 and updated with any changes.    The following portions of the patient's history were reviewed and updated as appropriate: allergies, current medications, past family history, past medical history, past social history, past surgical history and problem list.    Review of Systems   Constitutional: Negative for chills, fatigue and fever.   HENT: Negative for congestion, sneezing, sore throat and trouble swallowing.    Eyes: Negative for visual disturbance.   Respiratory: Negative for cough, chest tightness, shortness of breath and wheezing.    Cardiovascular: Negative for chest pain, palpitations and leg swelling.   Gastrointestinal: Negative for abdominal pain, constipation, diarrhea, nausea and vomiting.   Genitourinary: Negative for  "dysuria, frequency and urgency.   Musculoskeletal: Positive for arthralgias and back pain. Negative for neck pain.   Skin: Negative for rash.   Neurological: Negative for dizziness, weakness and headaches.   Psychiatric/Behavioral:        Patient denies any feelings of depression and has not felt down, hopeless or lost interest in any activities.   All other systems reviewed and are negative.  Review of systems has been reviewed and validated on 03/28/2022 and updated with any changes.        Objective   Vitals:    03/28/22 0931   BP: 138/76   BP Location: Left arm   Patient Position: Sitting   Cuff Size: Large Adult   Pulse: 80   Temp: 97.5 °F (36.4 °C)   TempSrc: Tympanic   SpO2: 98%   Weight: 109 kg (239 lb 12.8 oz)   Height: 180.3 cm (70.98\")   PainSc: 0-No pain      Body mass index is 33.46 kg/m².    Physical Exam   Constitutional: He is oriented to person, place, and time. He appears well-developed. No distress.   HENT:   Head: Normocephalic and atraumatic.   Eyes: Pupils are equal, round, and reactive to light. Conjunctivae are normal. No scleral icterus.   Cardiovascular: Normal rate, regular rhythm and normal heart sounds. Exam reveals no gallop and no friction rub.   No murmur heard.  Pulmonary/Chest: Effort normal and breath sounds normal. No respiratory distress. He has no wheezes. He has no rales.   Musculoskeletal:      Lumbar back: He exhibits decreased range of motion.   Neurological: He is alert and oriented to person, place, and time. No cranial nerve deficit.   Skin: Skin is warm and dry. No rash noted.   Psychiatric: His behavior is normal. Judgment and thought content normal.   Nursing note and vitals reviewed.  Physical exam has been reviewed and validated on 03/28/2022 and updated with any changes.        Assessment/Plan   Diagnoses and all orders for this visit:    1. Medicare annual wellness visit, subsequent (Primary)    2. Type 2 diabetes mellitus without complication, without long-term " current use of insulin (HCC)  -     Hemoglobin A1c; Future  -     metFORMIN ER (GLUCOPHAGE-XR) 500 MG 24 hr tablet; Take 2 tablets by mouth 2 (Two) Times a Day.  Dispense: 360 tablet; Refill: 1    3. Essential hypertension  -     Comprehensive Metabolic Panel; Future  -     T4, Free; Future  -     TSH; Future  -     Urinalysis With Culture If Indicated -; Future  -     amLODIPine (NORVASC) 5 MG tablet; Take 1 tablet by mouth Daily.  Dispense: 90 tablet; Refill: 1  -     lisinopril-hydrochlorothiazide (PRINZIDE,ZESTORETIC) 20-25 MG per tablet; Take 1 tablet by mouth Daily.  Dispense: 90 tablet; Refill: 1    4. Mixed hyperlipidemia  -     LDL Cholesterol, Direct; Future  -     pravastatin (PRAVACHOL) 80 MG tablet; Take 1 tablet by mouth Daily.  Dispense: 90 tablet; Refill: 3    5. Iron deficiency anemia secondary to inadequate dietary iron intake  -     CBC & Differential; Future    6. Thrombocytopenia (HCC)    7. Primary osteoarthritis involving multiple joints    8. Screening for prostate cancer  -     PSA Screen; Future         Labs are reviewed with patient.  His glucose is 140 with a hemoglobin A1c of 7.80.  This is up from the previous level of 6.83. He will continue with metformin.  He will try to be more compliant with diabetic diet.  He may monitor blood sugar one time daily.  His total cholesterol is 153, LDL 92 and triglycerides 174.  His HDL is 31.  He will continue with pravastatin 80 mg nightly for treatment of hyperlipidemia.  He has mild anemia which is stable.  His hemoglobin is 12.2 with hematocrit of 36.8.  He will continue with the daily iron supplement.  His platelets are decreased but stable/baseline at 130.  His blood pressure is controlled, and he will continue with his current blood pressure medication.     I reminded him that he is due for pneumonia vaccine, however he declines this today.      PHQ-2/PHQ-9 Depression Screening 3/28/2022   Retired PHQ-9 Total Score -   Retired Total Score -    Little Interest or Pleasure in Doing Things 0-->not at all   Feeling Down, Depressed or Hopeless 0-->not at all   PHQ-9: Brief Depression Severity Measure Score 0         Lab on 03/24/2022   Component Date Value Ref Range Status   • Glucose 03/24/2022 140 (A) 70 - 99 mg/dL Final   • BUN 03/24/2022 15  7 - 23 mg/dL Final   • Creatinine 03/24/2022 0.81  0.70 - 1.30 mg/dL Final   • Sodium 03/24/2022 140  137 - 145 mmol/L Final   • Potassium 03/24/2022 3.7  3.4 - 5.0 mmol/L Final   • Chloride 03/24/2022 104  101 - 112 mmol/L Final   • CO2 03/24/2022 25.0  22.0 - 30.0 mmol/L Final   • Calcium 03/24/2022 9.9  8.4 - 10.2 mg/dL Final   • Total Protein 03/24/2022 8.1  6.3 - 8.6 g/dL Final   • Albumin 03/24/2022 4.60  3.50 - 5.00 g/dL Final   • ALT (SGPT) 03/24/2022 44  <=50 U/L Final   • AST (SGOT) 03/24/2022 33  17 - 59 U/L Final   • Alkaline Phosphatase 03/24/2022 87  38 - 126 U/L Final   • Total Bilirubin 03/24/2022 0.8  0.2 - 1.3 mg/dL Final   • Globulin 03/24/2022 3.5  2.3 - 3.5 gm/dL Final   • A/G Ratio 03/24/2022 1.3  1.1 - 1.8 g/dL Final   • BUN/Creatinine Ratio 03/24/2022 18.5  7.0 - 25.0 Final   • Anion Gap 03/24/2022 11.0  5.0 - 15.0 mmol/L Final   • eGFR 03/24/2022 96.6  >60.0 mL/min/1.73 Final    National Kidney Foundation and American Society of Nephrology (ASN) Task Force recommended calculation based on the Chronic Kidney Disease Epidemiology Collaboration (CKD-EPI) equation refit without adjustment for race.   • Free T4 03/24/2022 1.39  0.93 - 1.70 ng/dL Final   • TSH 03/24/2022 1.930  0.270 - 4.200 uIU/mL Final   • Hemoglobin A1C 03/24/2022 7.80 (A) 4.80 - 5.60 % Final   • Total Cholesterol 03/24/2022 153  150 - 200 mg/dL Final   • Triglycerides 03/24/2022 174 (A) <=150 mg/dL Final   • HDL Cholesterol 03/24/2022 31 (A) 40 - 59 mg/dL Final   • LDL Cholesterol  03/24/2022 92  <=100 mg/dL Final   • VLDL Cholesterol 03/24/2022 30  5 - 40 mg/dL Final   • LDL/HDL Ratio 03/24/2022 2.81  0.00 - 3.55 Final   • WBC  03/24/2022 10.45  3.40 - 10.80 10*3/mm3 Final   • RBC 03/24/2022 5.58  4.14 - 5.80 10*6/mm3 Final   • Hemoglobin 03/24/2022 12.2 (A) 13.0 - 17.7 g/dL Final   • Hematocrit 03/24/2022 36.8 (A) 37.5 - 51.0 % Final   • MCV 03/24/2022 65.9 (A) 79.0 - 97.0 fL Final   • MCH 03/24/2022 21.9 (A) 26.6 - 33.0 pg Final   • MCHC 03/24/2022 33.2  31.5 - 35.7 g/dL Final   • RDW 03/24/2022 18.0 (A) 12.3 - 15.4 % Final   • RDW-SD 03/24/2022 41.3  37.0 - 54.0 fl Final   • MPV 03/24/2022 0.0 (A) 6.0 - 12.0 fL Final   • Platelets 03/24/2022 130 (A) 140 - 450 10*3/mm3 Final   • Microalbumin/Creatinine Ratio 03/24/2022    Final    Unable to calculate   • Creatinine, Urine 03/24/2022 94.8  mg/dL Final   • Microalbumin, Urine 03/24/2022 <1.2  mg/dL Final   • Color, UA 03/24/2022 Yellow  Yellow, Straw Final   • Appearance, UA 03/24/2022 Clear  Clear Final   • pH, UA 03/24/2022 6.5  5.5 - 8.0 Final   • Specific Gravity, UA 03/24/2022 1.020  1.005 - 1.030 Final   • Glucose, UA 03/24/2022 Negative  Negative Final   • Ketones, UA 03/24/2022 Negative  Negative Final   • Bilirubin, UA 03/24/2022 Negative  Negative Final   • Blood, UA 03/24/2022 Negative  Negative Final   • Protein, UA 03/24/2022 Negative  Negative Final   • Leuk Esterase, UA 03/24/2022 Negative  Negative Final   • Nitrite, UA 03/24/2022 Negative  Negative Final   • Urobilinogen, UA 03/24/2022 0.2 E.U./dL  0.2 - 1.0 E.U./dL Final   • Scan Slide 03/24/2022    Final    See Manual Differential Results   • Neutrophil % 03/24/2022 47.0  42.7 - 76.0 % Final   • Lymphocyte % 03/24/2022 45.0  19.6 - 45.3 % Final   • Monocyte % 03/24/2022 7.0  5.0 - 12.0 % Final   • Eosinophil % 03/24/2022 1.0  0.3 - 6.2 % Final   • Neutrophils Absolute 03/24/2022 4.91  1.70 - 7.00 10*3/mm3 Final   • Lymphocytes Absolute 03/24/2022 4.70 (A) 0.70 - 3.10 10*3/mm3 Final   • Monocytes Absolute 03/24/2022 0.73  0.10 - 0.90 10*3/mm3 Final   • Eosinophils Absolute 03/24/2022 0.10  0.00 - 0.40 10*3/mm3 Final   •  Hypochromia 03/24/2022 Slight/1+  None Seen Final   • Microcytes 03/24/2022 Mod/2+  None Seen Final   • WBC Morphology 03/24/2022 Normal  Normal Final   • Platelet Estimate 03/24/2022 Decreased  Normal Final   • Large Platelets 03/24/2022 Slight/1+  None Seen Final   ]        .  .  .  .

## 2022-06-01 DIAGNOSIS — E11.9 TYPE 2 DIABETES MELLITUS WITHOUT COMPLICATION, WITHOUT LONG-TERM CURRENT USE OF INSULIN: ICD-10-CM

## 2022-06-01 RX ORDER — BLOOD SUGAR DIAGNOSTIC
STRIP MISCELLANEOUS
Qty: 100 EACH | Refills: 1 | Status: SHIPPED | OUTPATIENT
Start: 2022-06-01 | End: 2023-01-09

## 2022-08-09 DIAGNOSIS — I10 ESSENTIAL HYPERTENSION: ICD-10-CM

## 2022-08-09 DIAGNOSIS — Z12.5 SCREENING FOR PROSTATE CANCER: ICD-10-CM

## 2022-08-09 DIAGNOSIS — E11.9 TYPE 2 DIABETES MELLITUS WITHOUT COMPLICATION, WITHOUT LONG-TERM CURRENT USE OF INSULIN: Primary | ICD-10-CM

## 2022-08-09 DIAGNOSIS — E78.2 MIXED HYPERLIPIDEMIA: ICD-10-CM

## 2022-09-02 DIAGNOSIS — E11.9 TYPE 2 DIABETES MELLITUS WITHOUT COMPLICATION, WITHOUT LONG-TERM CURRENT USE OF INSULIN: Chronic | ICD-10-CM

## 2022-09-02 DIAGNOSIS — I10 ESSENTIAL HYPERTENSION: Chronic | ICD-10-CM

## 2022-09-02 RX ORDER — AMLODIPINE BESYLATE 5 MG/1
5 TABLET ORAL DAILY
Qty: 90 TABLET | Refills: 1 | Status: SHIPPED | OUTPATIENT
Start: 2022-09-02 | End: 2023-03-13 | Stop reason: SDUPTHER

## 2022-09-02 RX ORDER — LISINOPRIL AND HYDROCHLOROTHIAZIDE 25; 20 MG/1; MG/1
1 TABLET ORAL DAILY
Qty: 90 TABLET | Refills: 1 | Status: SHIPPED | OUTPATIENT
Start: 2022-09-02 | End: 2023-03-13 | Stop reason: SDUPTHER

## 2022-09-02 RX ORDER — METFORMIN HYDROCHLORIDE 500 MG/1
TABLET, EXTENDED RELEASE ORAL
Qty: 360 TABLET | Refills: 1 | Status: SHIPPED | OUTPATIENT
Start: 2022-09-02 | End: 2022-09-29 | Stop reason: ALTCHOICE

## 2022-09-20 ENCOUNTER — LAB (OUTPATIENT)
Dept: LAB | Facility: OTHER | Age: 67
End: 2022-09-20

## 2022-09-20 DIAGNOSIS — Z12.5 SCREENING FOR PROSTATE CANCER: ICD-10-CM

## 2022-09-20 DIAGNOSIS — E11.9 TYPE 2 DIABETES MELLITUS WITHOUT COMPLICATION, WITHOUT LONG-TERM CURRENT USE OF INSULIN: ICD-10-CM

## 2022-09-20 DIAGNOSIS — E78.2 MIXED HYPERLIPIDEMIA: ICD-10-CM

## 2022-09-20 DIAGNOSIS — I10 ESSENTIAL HYPERTENSION: ICD-10-CM

## 2022-09-20 LAB
ALBUMIN SERPL-MCNC: 4.5 G/DL (ref 3.5–5)
ALBUMIN/GLOB SERPL: 1.5 G/DL (ref 1.1–1.8)
ALP SERPL-CCNC: 78 U/L (ref 38–126)
ALT SERPL W P-5'-P-CCNC: 39 U/L
ANION GAP SERPL CALCULATED.3IONS-SCNC: 9 MMOL/L (ref 5–15)
ARTICHOKE IGE QN: 87 MG/DL (ref 0–100)
AST SERPL-CCNC: 33 U/L (ref 17–59)
BACTERIA UR QL AUTO: ABNORMAL /HPF
BILIRUB SERPL-MCNC: 0.9 MG/DL (ref 0.2–1.3)
BILIRUB UR QL STRIP: NEGATIVE
BUN SERPL-MCNC: 17 MG/DL (ref 7–23)
BUN/CREAT SERPL: 15 (ref 7–25)
CALCIUM SPEC-SCNC: 9.1 MG/DL (ref 8.4–10.2)
CHLORIDE SERPL-SCNC: 105 MMOL/L (ref 101–112)
CLARITY UR: CLEAR
CO2 SERPL-SCNC: 26 MMOL/L (ref 22–30)
COLOR UR: YELLOW
CREAT SERPL-MCNC: 1.13 MG/DL (ref 0.7–1.3)
DEPRECATED RDW RBC AUTO: 39.9 FL (ref 37–54)
EGFRCR SERPLBLD CKD-EPI 2021: 70.8 ML/MIN/1.73
EOSINOPHIL # BLD MANUAL: 0.07 10*3/MM3 (ref 0–0.4)
EOSINOPHIL NFR BLD MANUAL: 1 % (ref 0.3–6.2)
ERYTHROCYTE [DISTWIDTH] IN BLOOD BY AUTOMATED COUNT: 17.2 % (ref 12.3–15.4)
GLOBULIN UR ELPH-MCNC: 3.1 GM/DL (ref 2.3–3.5)
GLUCOSE SERPL-MCNC: 123 MG/DL (ref 70–99)
GLUCOSE UR STRIP-MCNC: NEGATIVE MG/DL
HBA1C MFR BLD: 6.9 % (ref 4.8–5.6)
HCT VFR BLD AUTO: 36.6 % (ref 37.5–51)
HGB BLD-MCNC: 11.7 G/DL (ref 13–17.7)
HGB UR QL STRIP.AUTO: ABNORMAL
HYALINE CASTS UR QL AUTO: ABNORMAL /LPF
HYPOCHROMIA BLD QL: ABNORMAL
KETONES UR QL STRIP: NEGATIVE
LEUKOCYTE ESTERASE UR QL STRIP.AUTO: NEGATIVE
LYMPHOCYTES # BLD MANUAL: 3.25 10*3/MM3 (ref 0.7–3.1)
LYMPHOCYTES NFR BLD MANUAL: 8 % (ref 5–12)
MCH RBC QN AUTO: 20.9 PG (ref 26.6–33)
MCHC RBC AUTO-ENTMCNC: 32 G/DL (ref 31.5–35.7)
MCV RBC AUTO: 65.5 FL (ref 79–97)
MICROCYTES BLD QL: ABNORMAL
MONOCYTES # BLD: 0.59 10*3/MM3 (ref 0.1–0.9)
NEUTROPHILS # BLD AUTO: 3.47 10*3/MM3 (ref 1.7–7)
NEUTROPHILS NFR BLD MANUAL: 47 % (ref 42.7–76)
NITRITE UR QL STRIP: NEGATIVE
PH UR STRIP.AUTO: 5.5 [PH] (ref 5.5–8)
PLATELET # BLD AUTO: 129 10*3/MM3 (ref 140–450)
PMV BLD AUTO: 0 FL (ref 6–12)
POTASSIUM SERPL-SCNC: 3.6 MMOL/L (ref 3.4–5)
PROT SERPL-MCNC: 7.6 G/DL (ref 6.3–8.6)
PROT UR QL STRIP: NEGATIVE
PSA SERPL-MCNC: 1.39 NG/ML (ref 0–4)
RBC # BLD AUTO: 5.59 10*6/MM3 (ref 4.14–5.8)
RBC # UR STRIP: ABNORMAL /HPF
REF LAB TEST METHOD: ABNORMAL
SCAN SLIDE: NORMAL
SMALL PLATELETS BLD QL SMEAR: ABNORMAL
SODIUM SERPL-SCNC: 140 MMOL/L (ref 137–145)
SP GR UR STRIP: 1.02 (ref 1–1.03)
SQUAMOUS #/AREA URNS HPF: ABNORMAL /HPF
T4 FREE SERPL-MCNC: 1.21 NG/DL (ref 0.93–1.7)
TSH SERPL DL<=0.05 MIU/L-ACNC: 1.58 UIU/ML (ref 0.27–4.2)
UROBILINOGEN UR QL STRIP: ABNORMAL
VARIANT LYMPHS NFR BLD MANUAL: 44 % (ref 19.6–45.3)
WBC # UR STRIP: ABNORMAL /HPF
WBC MORPH BLD: NORMAL
WBC NRBC COR # BLD: 7.39 10*3/MM3 (ref 3.4–10.8)

## 2022-09-20 PROCEDURE — 85025 COMPLETE CBC W/AUTO DIFF WBC: CPT | Performed by: INTERNAL MEDICINE

## 2022-09-20 PROCEDURE — 81001 URINALYSIS AUTO W/SCOPE: CPT | Performed by: INTERNAL MEDICINE

## 2022-09-20 PROCEDURE — 84439 ASSAY OF FREE THYROXINE: CPT | Performed by: INTERNAL MEDICINE

## 2022-09-20 PROCEDURE — 83036 HEMOGLOBIN GLYCOSYLATED A1C: CPT | Performed by: INTERNAL MEDICINE

## 2022-09-20 PROCEDURE — 80053 COMPREHEN METABOLIC PANEL: CPT | Performed by: INTERNAL MEDICINE

## 2022-09-20 PROCEDURE — G0103 PSA SCREENING: HCPCS | Performed by: INTERNAL MEDICINE

## 2022-09-20 PROCEDURE — 84443 ASSAY THYROID STIM HORMONE: CPT | Performed by: INTERNAL MEDICINE

## 2022-09-20 PROCEDURE — 83721 ASSAY OF BLOOD LIPOPROTEIN: CPT | Performed by: INTERNAL MEDICINE

## 2022-09-20 PROCEDURE — 36415 COLL VENOUS BLD VENIPUNCTURE: CPT | Performed by: INTERNAL MEDICINE

## 2022-09-21 NOTE — PROGRESS NOTES
Chief Complaint  Establish Care (6 month f/u labs)    Subjective        History of Present Illness     Tao Cuadra presents to the office to establish care in our practice.  He is a prior patient of Dr. Juan Hodges.  He is retired from \[employer]\"", living in [place] with his wife.  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  His chronic medical issues include: Type 2 diabetes on metformin ER 1000 mg b.i.d.,  Essential hypertension treated with lisinopril HCT 20/25 mg daily and amlodipine 5 mg daily, and hyperlipidemia treated with pravastatin 80 mg daily.  He has iron deficiency anemia and takes a daily iron supplement.  He also has chronic thrombocytopenia which is gradually worsening.  He reports that he is not aware of the etiology of the mild thrombocytopenia, but feels it has been present for years.  Ultrasound of the liver approximately 6 years ago made no mention of fatty liver.  Past history does not include any mention of liver disease. He has osteoarthritis, for which he takes OTC NSAIDs as needed.      He has some chronic dependent edema of the bilateral lower extremities despite lisinopril HCT 20/25.  He has never tried compression stockings.  He denies PND or orthopnea.    The patient's relevant past medical, surgical, and social history was reviewed in Epic.   Lab results are reviewed with the patient today. Fasting glucose 123.   A1c 6.9.  Normal renal and liver function.  Normal thyroid screen.  LDL at goal with pravastatin.  Hemoglobin lower at 11.7.  Platelets 129,000.  I ordered an anemia work-up today, which is pending.  I ordered stool Hemoccult cards x3.  He is up-to-date on colonoscopy.  He denies any evidence of GI or  blood loss.    His blood pressure is reasonably controlled today.  Weight is down 5 pounds in the past 6 months, but still above goal with BMI 32.2.  We discussed an ultimate weight goal of approximately 210 pounds.    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  Podiatry follows him episodically.  He denies any foot lesions.    He has already obtained the new COVID-vaccine.  He gets influenza vaccination each November.    Objective   Vital Signs:  /82   Pulse 66   Temp 97.6 °F (36.4 °C)   Ht 180.3 cm (71\")   Wt 105 kg (231 lb)   SpO2 97%   BMI 32.22 kg/m²   Estimated body mass index is 32.22 kg/m² as calculated from the following:    Height as of this encounter: 180.3 cm (71\").    Weight as of this encounter: 105 kg (231 lb).    BMI is >= 30 and <35. (Class 1 Obesity). The following options were offered after discussion;: weight loss educational material (shared in after visit summary)      Physical Exam  Vitals reviewed.   Constitutional:       General: He is not in acute distress.     Appearance: He is well-developed. He is obese.      Comments: Very pleasant gentleman   HENT:      Head: Normocephalic and atraumatic.      Nose:      Right Sinus: No maxillary sinus tenderness or frontal sinus tenderness.      Left Sinus: No maxillary sinus tenderness or frontal sinus tenderness.      Mouth/Throat:      Mouth: No oral lesions.      Pharynx: Uvula midline.      Tonsils: No tonsillar exudate.   Eyes:      Conjunctiva/sclera: Conjunctivae normal.      Pupils: Pupils are equal, round, and reactive to light.   Neck:      Thyroid: No thyroid mass or thyromegaly.      Vascular: No carotid bruit or JVD.      Trachea: Trachea normal. No tracheal deviation.   Cardiovascular:      Rate and Rhythm: Normal rate and regular rhythm.  No extrasystoles are present.     Chest Wall: PMI is not displaced.      Heart sounds: Normal heart sounds. No murmur heard.  Pulmonary:      Effort: Pulmonary effort is normal. No accessory muscle usage or respiratory distress.      Breath sounds: Normal breath sounds. No decreased breath sounds, wheezing, rhonchi or rales.   Abdominal:      General: Bowel sounds " are normal. There is no distension.      Palpations: Abdomen is soft.      Tenderness: There is no abdominal tenderness.      Comments: Obese abdomen   Musculoskeletal:      Cervical back: Neck supple.   Lymphadenopathy:      Cervical: No cervical adenopathy.   Skin:     General: Skin is warm and dry.      Findings: No rash.      Nails: There is no clubbing.   Neurological:      General: No focal deficit present.      Mental Status: He is alert and oriented to person, place, and time. Mental status is at baseline.      Cranial Nerves: No cranial nerve deficit.      Coordination: Coordination normal.   Psychiatric:         Mood and Affect: Mood normal.         Speech: Speech normal.         Behavior: Behavior normal.         Thought Content: Thought content normal.         Judgment: Judgment normal.            Result Review :    CMP    CMP 3/24/22 9/20/22   Glucose 140 (A) 123 (A)   BUN 15 17   Creatinine 0.81 1.13   Sodium 140 140   Potassium 3.7 3.6   Chloride 104 105   Calcium 9.9 9.1   Albumin 4.60 4.50   Total Bilirubin 0.8 0.9   Alkaline Phosphatase 87 78   AST (SGOT) 33 33   ALT (SGPT) 44 39   (A) Abnormal value            CBC w/diff    CBC w/Diff 3/24/22 9/20/22   WBC 10.45 7.39   RBC 5.58 5.59   Hemoglobin 12.2 (A) 11.7 (A)   Hematocrit 36.8 (A) 36.6 (A)   MCV 65.9 (A) 65.5 (A)   MCH 21.9 (A) 20.9 (A)   MCHC 33.2 32.0   RDW 18.0 (A) 17.2 (A)   Platelets 130 (A) 129 (A)   (A) Abnormal value            Lipid Panel    Lipid Panel 3/24/22 9/20/22   Total Cholesterol 153    Triglycerides 174 (A)    HDL Cholesterol 31 (A)    VLDL Cholesterol 30    LDL Cholesterol  92 87   LDL/HDL Ratio 2.81    (A) Abnormal value            TSH    TSH 3/24/22 9/20/22   TSH 1.930 1.580           A1C Last 3 Results    HGBA1C Last 3 Results 3/24/22 9/20/22   Hemoglobin A1C 7.80 (A) 6.90 (A)   (A) Abnormal value            PSA    PSA 9/20/22   PSA 1.390                     Assessment and Plan   Diagnoses and all orders for this  visit:    1. Type 2 diabetes mellitus without complication, without long-term current use of insulin (HCC) (Primary)    2. Essential hypertension    3. Iron deficiency anemia secondary to inadequate dietary iron intake  -     Ambulatory Referral to Hematology / Oncology  -     Ferritin; Future  -     Folate; Future  -     Iron Profile; Future  -     Vitamin B12; Future  -     Occult Blood X 3, Stool - Stool, Per Rectum; Future    4. Hyperlipidemia associated with type 2 diabetes mellitus (HCC)    5. Thrombocytopenia (HCC)  -     Ambulatory Referral to Hematology / Oncology  -     Ferritin; Future  -     Folate; Future  -     Iron Profile; Future  -     Vitamin B12; Future  -     Occult Blood X 3, Stool - Stool, Per Rectum; Future    6. Class 1 obesity due to excess calories with serious comorbidity and body mass index (BMI) of 32.0 to 32.9 in adult    Other orders  -     metFORMIN (GLUCOPHAGE) 850 MG tablet; Take 1 tablet by mouth 2 (Two) Times a Day. For diabetes  Dispense: 180 tablet; Refill: 3    Changes metformin from the XR version to the standard release version metformin 1000 mg twice daily.  This might provide some additional appetite suppression.  Try to intensify efforts at diabetic diet, exercise, and weight loss with weight goal of approximately 210 pounds.  If he has any tolerability issues with the metformin or his not reaching goal, we will add additional therapy.  He would be a very good candidate for a GLP-1 agent or SGLT2 agent in the future    Continue the lisinopril HCT and Norvasc for now.  Norvasc could be contributing to edema somewhat.  Pursue sodium restriction and weight loss and increase regular exercise as mentioned above.  I mentioned that he could help manage the edema with some type of the Compression stockings.  Keep legs elevated when not ambulating.    I cannot find evidence that the thrombocytopenia has ever been worked up and now he has a worsening anemia.  Obtain anemia work-up  on the way out today and collect stool for Hemoccult x3.  Collect a stool cards next week after holding iron for few days.  Resume the iron supplement after collecting the stool cards.  Refer to Dr. Murillo for further evaluation.  He is up-to-date on colonoscopy.    Cholesterol is quite reasonable with high-dose pravastatin 80 mg daily.  Continue diet and intensify dietary efforts.    Return to clinic in 6 months with fasting labs prior, sooner if any problems.       I spent 45 minutes caring for Tao on this date of service. This time includes time spent by me in the following activities:preparing for the visit, reviewing tests, obtaining and/or reviewing a separately obtained history, performing a medically appropriate examination and/or evaluation , counseling and educating the patient/family/caregiver, ordering medications, tests, or procedures, referring and communicating with other health care professionals , documenting information in the medical record and care coordination with his pharmacist      Follow Up   Return in about 6 months (around 3/29/2023) for Next scheduled follow up - labs 1 week prior.  Patient was given instructions and counseling regarding his condition or for health maintenance advice. Please see specific information pulled into the AVS if appropriate.

## 2022-09-29 ENCOUNTER — OFFICE VISIT (OUTPATIENT)
Dept: FAMILY MEDICINE CLINIC | Facility: CLINIC | Age: 67
End: 2022-09-29

## 2022-09-29 ENCOUNTER — LAB (OUTPATIENT)
Dept: LAB | Facility: OTHER | Age: 67
End: 2022-09-29

## 2022-09-29 VITALS
TEMPERATURE: 97.6 F | WEIGHT: 231 LBS | OXYGEN SATURATION: 97 % | BODY MASS INDEX: 32.34 KG/M2 | HEART RATE: 66 BPM | HEIGHT: 71 IN | DIASTOLIC BLOOD PRESSURE: 82 MMHG | SYSTOLIC BLOOD PRESSURE: 130 MMHG

## 2022-09-29 DIAGNOSIS — D69.6 THROMBOCYTOPENIA: Chronic | ICD-10-CM

## 2022-09-29 DIAGNOSIS — E11.69 HYPERLIPIDEMIA ASSOCIATED WITH TYPE 2 DIABETES MELLITUS: Chronic | ICD-10-CM

## 2022-09-29 DIAGNOSIS — E11.9 TYPE 2 DIABETES MELLITUS WITHOUT COMPLICATION, WITHOUT LONG-TERM CURRENT USE OF INSULIN: Primary | Chronic | ICD-10-CM

## 2022-09-29 DIAGNOSIS — D50.8 IRON DEFICIENCY ANEMIA SECONDARY TO INADEQUATE DIETARY IRON INTAKE: Chronic | ICD-10-CM

## 2022-09-29 DIAGNOSIS — S98.121S: ICD-10-CM

## 2022-09-29 DIAGNOSIS — I10 ESSENTIAL HYPERTENSION: Chronic | ICD-10-CM

## 2022-09-29 DIAGNOSIS — E66.09 CLASS 1 OBESITY DUE TO EXCESS CALORIES WITH SERIOUS COMORBIDITY AND BODY MASS INDEX (BMI) OF 32.0 TO 32.9 IN ADULT: Chronic | ICD-10-CM

## 2022-09-29 DIAGNOSIS — E78.5 HYPERLIPIDEMIA ASSOCIATED WITH TYPE 2 DIABETES MELLITUS: Chronic | ICD-10-CM

## 2022-09-29 PROBLEM — I87.2 CHRONIC VENOUS INSUFFICIENCY OF LOWER EXTREMITY: Chronic | Status: ACTIVE | Noted: 2022-09-29

## 2022-09-29 PROCEDURE — 99215 OFFICE O/P EST HI 40 MIN: CPT | Performed by: INTERNAL MEDICINE

## 2022-09-29 PROCEDURE — 84466 ASSAY OF TRANSFERRIN: CPT | Performed by: INTERNAL MEDICINE

## 2022-09-29 PROCEDURE — 36415 COLL VENOUS BLD VENIPUNCTURE: CPT | Performed by: INTERNAL MEDICINE

## 2022-09-29 PROCEDURE — 82728 ASSAY OF FERRITIN: CPT | Performed by: INTERNAL MEDICINE

## 2022-09-29 PROCEDURE — 82607 VITAMIN B-12: CPT | Performed by: INTERNAL MEDICINE

## 2022-09-29 PROCEDURE — 83540 ASSAY OF IRON: CPT | Performed by: INTERNAL MEDICINE

## 2022-09-29 PROCEDURE — 82746 ASSAY OF FOLIC ACID SERUM: CPT | Performed by: INTERNAL MEDICINE

## 2022-09-29 NOTE — PATIENT INSTRUCTIONS
Calorie Counting for Weight Loss  Calories are units of energy. Your body needs a certain number of calories from food to keep going throughout the day. When you eat or drink more calories than your body needs, your body stores the extra calories mostly as fat. When you eat or drink fewer calories than your body needs, your body burns fat to get the energy it needs.  Calorie counting means keeping track of how many calories you eat and drink each day. Calorie counting can be helpful if you need to lose weight. If you eat fewer calories than your body needs, you should lose weight. Ask your health care provider what a healthy weight is for you.  For calorie counting to work, you will need to eat the right number of calories each day to lose a healthy amount of weight per week. A dietitian can help you figure out how many calories you need in a day and will suggest ways to reach your calorie goal.  A healthy amount of weight to lose each week is usually 1-2 lb (0.5-0.9 kg). This usually means that your daily calorie intake should be reduced by 500-750 calories.  Eating 1,200-1,500 calories a day can help most women lose weight.  Eating 1,500-1,800 calories a day can help most men lose weight.  What do I need to know about calorie counting?  Work with your health care provider or dietitian to determine how many calories you should get each day. To meet your daily calorie goal, you will need to:  Find out how many calories are in each food that you would like to eat. Try to do this before you eat.  Decide how much of the food you plan to eat.  Keep a food log. Do this by writing down what you ate and how many calories it had.  To successfully lose weight, it is important to balance calorie counting with a healthy lifestyle that includes regular activity.  Where do I find calorie information?  The number of calories in a food can be found on a Nutrition Facts label. If a food does not have a Nutrition Facts label, try to  look up the calories online or ask your dietitian for help.  Remember that calories are listed per serving. If you choose to have more than one serving of a food, you will have to multiply the calories per serving by the number of servings you plan to eat. For example, the label on a package of bread might say that a serving size is 1 slice and that there are 90 calories in a serving. If you eat 1 slice, you will have eaten 90 calories. If you eat 2 slices, you will have eaten 180 calories.  How do I keep a food log?  After each time that you eat, record the following in your food log as soon as possible:  What you ate. Be sure to include toppings, sauces, and other extras on the food.  How much you ate. This can be measured in cups, ounces, or number of items.  How many calories were in each food and drink.  The total number of calories in the food you ate.  Keep your food log near you, such as in a pocket-sized notebook or on an grace or website on your mobile phone. Some programs will calculate calories for you and show you how many calories you have left to meet your daily goal.  What are some portion-control tips?  Know how many calories are in a serving. This will help you know how many servings you can have of a certain food.  Use a measuring cup to measure serving sizes. You could also try weighing out portions on a kitchen scale. With time, you will be able to estimate serving sizes for some foods.  Take time to put servings of different foods on your favorite plates or in your favorite bowls and cups so you know what a serving looks like.  Try not to eat straight from a food's packaging, such as from a bag or box. Eating straight from the package makes it hard to see how much you are eating and can lead to overeating. Put the amount you would like to eat in a cup or on a plate to make sure you are eating the right portion.  Use smaller plates, glasses, and bowls for smaller portions and to prevent  overeating.  Try not to multitask. For example, avoid watching TV or using your computer while eating. If it is time to eat, sit down at a table and enjoy your food. This will help you recognize when you are full. It will also help you be more mindful of what and how much you are eating.  What are tips for following this plan?  Reading food labels  Check the calorie count compared with the serving size. The serving size may be smaller than what you are used to eating.  Check the source of the calories. Try to choose foods that are high in protein, fiber, and vitamins, and low in saturated fat, trans fat, and sodium.  Shopping  Read nutrition labels while you shop. This will help you make healthy decisions about which foods to buy.  Pay attention to nutrition labels for low-fat or fat-free foods. These foods sometimes have the same number of calories or more calories than the full-fat versions. They also often have added sugar, starch, or salt to make up for flavor that was removed with the fat.  Make a grocery list of lower-calorie foods and stick to it.  Cooking  Try to cook your favorite foods in a healthier way. For example, try baking instead of frying.  Use low-fat dairy products.  Meal planning  Use more fruits and vegetables. One-half of your plate should be fruits and vegetables.  Include lean proteins, such as chicken, turkey, and fish.  Lifestyle  Each week, aim to do one of the followin minutes of moderate exercise, such as walking.  75 minutes of vigorous exercise, such as running.  General information  Know how many calories are in the foods you eat most often. This will help you calculate calorie counts faster.  Find a way of tracking calories that works for you. Get creative. Try different apps or programs if writing down calories does not work for you.  What foods should I eat?    Eat nutritious foods. It is better to have a nutritious, high-calorie food, such as an avocado, than a food with  few nutrients, such as a bag of potato chips.  Use your calories on foods and drinks that will fill you up and will not leave you hungry soon after eating.  Examples of foods that fill you up are nuts and nut butters, vegetables, lean proteins, and high-fiber foods such as whole grains. High-fiber foods are foods with more than 5 g of fiber per serving.  Pay attention to calories in drinks. Low-calorie drinks include water and unsweetened drinks.  The items listed above may not be a complete list of foods and beverages you can eat. Contact a dietitian for more information.  What foods should I limit?  Limit foods or drinks that are not good sources of vitamins, minerals, or protein or that are high in unhealthy fats. These include:  Candy.  Other sweets.  Sodas, specialty coffee drinks, alcohol, and juice.  The items listed above may not be a complete list of foods and beverages you should avoid. Contact a dietitian for more information.  How do I count calories when eating out?  Pay attention to portions. Often, portions are much larger when eating out. Try these tips to keep portions smaller:  Consider sharing a meal instead of getting your own.  If you get your own meal, eat only half of it. Before you start eating, ask for a container and put half of your meal into it.  When available, consider ordering smaller portions from the menu instead of full portions.  Pay attention to your food and drink choices. Knowing the way food is cooked and what is included with the meal can help you eat fewer calories.  If calories are listed on the menu, choose the lower-calorie options.  Choose dishes that include vegetables, fruits, whole grains, low-fat dairy products, and lean proteins.  Choose items that are boiled, broiled, grilled, or steamed. Avoid items that are buttered, battered, fried, or served with cream sauce. Items labeled as crispy are usually fried, unless stated otherwise.  Choose water, low-fat milk,  unsweetened iced tea, or other drinks without added sugar. If you want an alcoholic beverage, choose a lower-calorie option, such as a glass of wine or light beer.  Ask for dressings, sauces, and syrups on the side. These are usually high in calories, so you should limit the amount you eat.  If you want a salad, choose a garden salad and ask for grilled meats. Avoid extra toppings such as andersen, cheese, or fried items. Ask for the dressing on the side, or ask for olive oil and vinegar or lemon to use as dressing.  Estimate how many servings of a food you are given. Knowing serving sizes will help you be aware of how much food you are eating at restaurants.  Where to find more information  Centers for Disease Control and Prevention: www.cdc.gov  [place] Department of Agriculture: myplate.gov  Summary  Calorie counting means keeping track of how many calories you eat and drink each day. If you eat fewer calories than your body needs, you should lose weight.  A healthy amount of weight to lose per week is usually 1-2 lb (0.5-0.9 kg). This usually means reducing your daily calorie intake by 500-750 calories.  The number of calories in a food can be found on a Nutrition Facts label. If a food does not have a Nutrition Facts label, try to look up the calories online or ask your dietitian for help.  Use smaller plates, glasses, and bowls for smaller portions and to prevent overeating.  Use your calories on foods and drinks that will fill you up and not leave you hungry shortly after a meal.  This information is not intended to replace advice given to you by your health care provider. Make sure you discuss any questions you have with your health care provider.  Document Revised: 01/28/2021 Document Reviewed: 01/28/2021  Elsevier Patient Education © 2022 [employer]

## 2022-09-30 LAB
FERRITIN SERPL-MCNC: 150 NG/ML (ref 30–400)
FOLATE SERPL-MCNC: 11.8 NG/ML (ref 4.78–24.2)
IRON 24H UR-MRATE: 75 MCG/DL (ref 59–158)
IRON SATN MFR SERPL: 16 % (ref 20–50)
TIBC SERPL-MCNC: 475 MCG/DL (ref 298–536)
TRANSFERRIN SERPL-MCNC: 319 MG/DL (ref 200–360)
VIT B12 BLD-MCNC: 495 PG/ML (ref 211–946)

## 2022-10-06 ENCOUNTER — LAB (OUTPATIENT)
Dept: LAB | Facility: OTHER | Age: 67
End: 2022-10-06

## 2022-10-06 DIAGNOSIS — D50.8 IRON DEFICIENCY ANEMIA SECONDARY TO INADEQUATE DIETARY IRON INTAKE: Chronic | ICD-10-CM

## 2022-10-06 DIAGNOSIS — D69.6 THROMBOCYTOPENIA: Chronic | ICD-10-CM

## 2022-10-06 LAB
COLLECT DATE SP2 STL: NORMAL
COLLECT DATE SP3 STL: NORMAL
COLLECT DATE STL: NORMAL
GASTROCULT GAST QL: NEGATIVE
HEMOCCULT SP2 STL QL: NEGATIVE
HEMOCCULT SP3 STL QL: NEGATIVE
Lab: 730
Lab: 800
Lab: 830

## 2022-10-06 PROCEDURE — 82272 OCCULT BLD FECES 1-3 TESTS: CPT | Performed by: INTERNAL MEDICINE

## 2022-10-14 ENCOUNTER — TELEPHONE (OUTPATIENT)
Dept: FAMILY MEDICINE CLINIC | Facility: CLINIC | Age: 67
End: 2022-10-14

## 2022-10-14 ENCOUNTER — CLINICAL SUPPORT (OUTPATIENT)
Dept: FAMILY MEDICINE CLINIC | Facility: CLINIC | Age: 67
End: 2022-10-14

## 2022-10-14 DIAGNOSIS — Z23 NEED FOR INFLUENZA VACCINATION: Primary | ICD-10-CM

## 2022-10-14 PROCEDURE — G0008 ADMIN INFLUENZA VIRUS VAC: HCPCS | Performed by: FAMILY MEDICINE

## 2022-10-14 PROCEDURE — 90662 IIV NO PRSV INCREASED AG IM: CPT | Performed by: FAMILY MEDICINE

## 2022-10-14 NOTE — TELEPHONE ENCOUNTER
Patient was seen today for a flu vaccine. He would like Dr Jarvis to order him the shingrix vaccine. Send to [facility] pharmacy.

## 2022-10-18 ENCOUNTER — CONSULT (OUTPATIENT)
Dept: ONCOLOGY | Facility: CLINIC | Age: 67
End: 2022-10-18

## 2022-10-18 VITALS
DIASTOLIC BLOOD PRESSURE: 80 MMHG | RESPIRATION RATE: 18 BRPM | BODY MASS INDEX: 31.94 KG/M2 | TEMPERATURE: 96.2 F | WEIGHT: 229 LBS | SYSTOLIC BLOOD PRESSURE: 161 MMHG | HEART RATE: 78 BPM | OXYGEN SATURATION: 94 %

## 2022-10-18 DIAGNOSIS — D56.3 ALPHA THALASSAEMIA MINOR: Primary | ICD-10-CM

## 2022-10-18 DIAGNOSIS — D69.6 THROMBOCYTOPENIA: Chronic | ICD-10-CM

## 2022-10-18 PROCEDURE — 99203 OFFICE O/P NEW LOW 30 MIN: CPT | Performed by: INTERNAL MEDICINE

## 2022-10-18 PROCEDURE — G0463 HOSPITAL OUTPT CLINIC VISIT: HCPCS | Performed by: INTERNAL MEDICINE

## 2022-10-18 NOTE — PROGRESS NOTES
"Chief Complaint  Microcytic anemia     Subjective        Tao Cuadra presents to [facility]  History of Present Illness      This is a pleasant 67-year-old male who was seen in consultation at the request of Chavez Jarvis MD for evaluation of anemia. patient tells me that he has been anemic since he was in 10 years old.  His hemoglobin is mildly low at 11.7 with severe microcytosis of 65.5.  Iron studies were overall normal except for mild low iron saturation.  He had colonoscopy performed in 2019 which was unremarkable.  Denies any bleeding.  He has been taking oral iron tablet without improvement.  I been asked to assist with evaluation and management of his microcytic anemia.    Objective   Vital Signs:  /80   Pulse 78   Temp 96.2 °F (35.7 °C)   Resp 18   Wt 104 kg (229 lb)   SpO2 94%   BMI 31.94 kg/m²   Estimated body mass index is 31.94 kg/m² as calculated from the following:    Height as of 9/29/22: 180.3 cm (71\").    Weight as of this encounter: 104 kg (229 lb).          Physical Exam  Vitals and nursing note reviewed.   Constitutional:       Appearance: Normal appearance.   Neurological:      General: No focal deficit present.      Mental Status: He is alert and oriented to person, place, and time. Mental status is at baseline.   Psychiatric:         Mood and Affect: Mood normal.         Behavior: Behavior normal.         Thought Content: Thought content normal.        Result Review :  The following data was reviewed by: Triny Paz MD on 10/18/2022:  Common labs    Common Labs 3/24/22 3/24/22 3/24/22 3/24/22 3/24/22 9/20/22 9/20/22 9/20/22 9/20/22 9/20/22    0800 0800 0800 0800 0805 0816 0816 0816 0816 0816   Glucose 140 (A)     123 (A)       BUN 15     17       Creatinine 0.81     1.13       Sodium 140     140       Potassium 3.7     3.6       Chloride 104     105       Calcium 9.9     9.1       Albumin 4.60     4.50       Total " Bilirubin 0.8     0.9       Alkaline Phosphatase 87     78       AST (SGOT) 33     33       ALT (SGPT) 44     39       WBC   10.45    7.39      Hemoglobin   12.2 (A)    11.7 (A)      Hematocrit   36.8 (A)    36.6 (A)      Platelets   130 (A)    129 (A)      Total Cholesterol  153           Triglycerides  174 (A)           HDL Cholesterol  31 (A)           LDL Cholesterol   92      87     Hemoglobin A1C    7.80 (A)      6.90 (A)   Microalbumin, Urine     <1.2        PSA         1.390    (A) Abnormal value            CMP    CMP 3/24/22 9/20/22   Glucose 140 (A) 123 (A)   BUN 15 17   Creatinine 0.81 1.13   Sodium 140 140   Potassium 3.7 3.6   Chloride 104 105   Calcium 9.9 9.1   Albumin 4.60 4.50   Total Bilirubin 0.8 0.9   Alkaline Phosphatase 87 78   AST (SGOT) 33 33   ALT (SGPT) 44 39   (A) Abnormal value            CBC    CBC 3/24/22 9/20/22   WBC 10.45 7.39   RBC 5.58 5.59   Hemoglobin 12.2 (A) 11.7 (A)   Hematocrit 36.8 (A) 36.6 (A)   MCV 65.9 (A) 65.5 (A)   MCH 21.9 (A) 20.9 (A)   MCHC 33.2 32.0   RDW 18.0 (A) 17.2 (A)   Platelets 130 (A) 129 (A)   (A) Abnormal value            CBC w/diff    CBC w/Diff 3/24/22 9/20/22   WBC 10.45 7.39   RBC 5.58 5.59   Hemoglobin 12.2 (A) 11.7 (A)   Hematocrit 36.8 (A) 36.6 (A)   MCV 65.9 (A) 65.5 (A)   MCH 21.9 (A) 20.9 (A)   MCHC 33.2 32.0   RDW 18.0 (A) 17.2 (A)   Platelets 130 (A) 129 (A)   (A) Abnormal value            Tao Cuadra reports a pain score of 0.  Given his pain assessment as noted, treatment options were discussed and the following options were decided upon as a follow-up plan to address the patient's pain: continuation of current treatment plan for pain.    Patient screened negative for depression based on a PHQ-9 score of 0 on 10/18/2022.   Advance Care Planning   ACP discussion was declined by the patient. Patient does not have an advance directive, declines further assistance.         Assessment and Plan   Diagnoses and all orders for this visit:    1.  Alpha thalassaemia minor (Primary)  -     CBC (No Diff); Future  -     Ferritin; Future  -     Iron Profile; Future    New diagnosis/problem for me.   Patient with life long history of severe microcytosis with mild anemia.   He tells me he has this since he was 10.   He has been taking oral iron without improvement.   He likely has underlying alpha thalassaemia minor.   Additional confirmatory testing was offered however discussed this would not .   He wants to continue to monitor.  Continue oral iron.    CBC, ferritin, iron profile in 6 months.     2. Thrombocytopenia (HCC)  -     CBC (No Diff); Future    New diagnosis for me.   Mild long standing thrombocytopenia.   No bleeding.   Recommend continue to monitor.   CBC in 6 months.          Follow Up   No follow-ups on file.  Patient was given instructions and counseling regarding his condition or for health maintenance advice. Please see specific information pulled into the AVS if appropriate.

## 2022-12-31 DIAGNOSIS — D50.8 IRON DEFICIENCY ANEMIA SECONDARY TO INADEQUATE DIETARY IRON INTAKE: Chronic | ICD-10-CM

## 2023-01-03 RX ORDER — IRON POLYSACCHARIDE COMPLEX 150 MG
CAPSULE ORAL
Qty: 90 CAPSULE | Refills: 3 | Status: SHIPPED | OUTPATIENT
Start: 2023-01-03

## 2023-01-06 RX ORDER — GLUCOSAM/CHON-MSM1/C/MANG/BOSW 500-416.6
TABLET ORAL
Qty: 100 EACH | Refills: 1 | Status: SHIPPED | OUTPATIENT
Start: 2023-01-06

## 2023-01-09 DIAGNOSIS — E11.9 TYPE 2 DIABETES MELLITUS WITHOUT COMPLICATION, WITHOUT LONG-TERM CURRENT USE OF INSULIN: ICD-10-CM

## 2023-01-09 RX ORDER — DEXTROSE 3.75 G
TABLET,CHEWABLE ORAL
Qty: 300 EACH | Refills: 3 | Status: SHIPPED | OUTPATIENT
Start: 2023-01-09

## 2023-03-08 ENCOUNTER — TELEPHONE (OUTPATIENT)
Dept: ONCOLOGY | Facility: CLINIC | Age: 68
End: 2023-03-08
Payer: MEDICARE

## 2023-03-08 NOTE — TELEPHONE ENCOUNTER
UNABLE TO REACH PT RE DR CASAS APPT ON 4-18-23 NEEDING TO BE MOVED DUE TO WALE OUT OF OFFICE SO WE MOVED HIM TO [facility]'S SCHEDULE AT THE SAME TIME IN CASE HE SHOWS UP FOR HIS APPT

## 2023-03-10 DIAGNOSIS — I10 ESSENTIAL HYPERTENSION: Chronic | ICD-10-CM

## 2023-03-13 DIAGNOSIS — I10 ESSENTIAL HYPERTENSION: Chronic | ICD-10-CM

## 2023-03-13 RX ORDER — LISINOPRIL AND HYDROCHLOROTHIAZIDE 25; 20 MG/1; MG/1
1 TABLET ORAL DAILY
Qty: 90 TABLET | Refills: 1 | OUTPATIENT
Start: 2023-03-13

## 2023-03-13 RX ORDER — AMLODIPINE BESYLATE 5 MG/1
5 TABLET ORAL DAILY
Qty: 90 TABLET | Refills: 1 | Status: SHIPPED | OUTPATIENT
Start: 2023-03-13

## 2023-03-13 RX ORDER — AMLODIPINE BESYLATE 5 MG/1
5 TABLET ORAL DAILY
Qty: 90 TABLET | Refills: 1 | OUTPATIENT
Start: 2023-03-13

## 2023-03-13 RX ORDER — LISINOPRIL AND HYDROCHLOROTHIAZIDE 25; 20 MG/1; MG/1
1 TABLET ORAL DAILY
Qty: 90 TABLET | Refills: 1 | Status: SHIPPED | OUTPATIENT
Start: 2023-03-13

## 2023-03-16 ENCOUNTER — LAB (OUTPATIENT)
Dept: LAB | Facility: OTHER | Age: 68
End: 2023-03-16
Payer: MEDICARE

## 2023-03-16 DIAGNOSIS — D56.3 ALPHA THALASSAEMIA MINOR: ICD-10-CM

## 2023-03-16 DIAGNOSIS — Z12.5 SCREENING FOR PROSTATE CANCER: ICD-10-CM

## 2023-03-16 DIAGNOSIS — D50.8 IRON DEFICIENCY ANEMIA SECONDARY TO INADEQUATE DIETARY IRON INTAKE: ICD-10-CM

## 2023-03-16 DIAGNOSIS — E78.5 HYPERLIPIDEMIA ASSOCIATED WITH TYPE 2 DIABETES MELLITUS: Chronic | ICD-10-CM

## 2023-03-16 DIAGNOSIS — E11.69 HYPERLIPIDEMIA ASSOCIATED WITH TYPE 2 DIABETES MELLITUS: Chronic | ICD-10-CM

## 2023-03-16 DIAGNOSIS — D69.6 THROMBOCYTOPENIA: ICD-10-CM

## 2023-03-16 DIAGNOSIS — E11.9 TYPE 2 DIABETES MELLITUS WITHOUT COMPLICATION, WITHOUT LONG-TERM CURRENT USE OF INSULIN: Primary | Chronic | ICD-10-CM

## 2023-03-16 DIAGNOSIS — D69.6 THROMBOCYTOPENIA: Chronic | ICD-10-CM

## 2023-03-16 DIAGNOSIS — E11.9 TYPE 2 DIABETES MELLITUS WITHOUT COMPLICATION, WITHOUT LONG-TERM CURRENT USE OF INSULIN: ICD-10-CM

## 2023-03-16 LAB
ALBUMIN SERPL-MCNC: 4.6 G/DL (ref 3.5–5)
ALBUMIN UR-MCNC: 3.3 MG/DL
ALBUMIN/GLOB SERPL: 1.3 G/DL (ref 1.1–1.8)
ALP SERPL-CCNC: 73 U/L (ref 38–126)
ALT SERPL W P-5'-P-CCNC: 44 U/L
ANION GAP SERPL CALCULATED.3IONS-SCNC: 10 MMOL/L (ref 5–15)
AST SERPL-CCNC: 36 U/L (ref 17–59)
BILIRUB SERPL-MCNC: 0.8 MG/DL (ref 0.2–1.3)
BUN SERPL-MCNC: 15 MG/DL (ref 7–23)
BUN/CREAT SERPL: 16.5 (ref 7–25)
CALCIUM SPEC-SCNC: 9.1 MG/DL (ref 8.4–10.2)
CHLORIDE SERPL-SCNC: 108 MMOL/L (ref 101–112)
CHOLEST SERPL-MCNC: 163 MG/DL (ref 150–200)
CO2 SERPL-SCNC: 26 MMOL/L (ref 22–30)
CREAT SERPL-MCNC: 0.91 MG/DL (ref 0.7–1.3)
CREAT UR-MCNC: 68.8 MG/DL
DEPRECATED RDW RBC AUTO: 40.8 FL (ref 37–54)
EGFRCR SERPLBLD CKD-EPI 2021: 91.8 ML/MIN/1.73
EOSINOPHIL # BLD MANUAL: 0.14 10*3/MM3 (ref 0–0.4)
EOSINOPHIL NFR BLD MANUAL: 2 % (ref 0.3–6.2)
ERYTHROCYTE [DISTWIDTH] IN BLOOD BY AUTOMATED COUNT: 17.1 % (ref 12.3–15.4)
GLOBULIN UR ELPH-MCNC: 3.5 GM/DL (ref 2.3–3.5)
GLUCOSE SERPL-MCNC: 120 MG/DL (ref 70–99)
HBA1C MFR BLD: 7.3 % (ref 4.8–5.6)
HCT VFR BLD AUTO: 36.5 % (ref 37.5–51)
HDLC SERPL-MCNC: 33 MG/DL (ref 40–59)
HGB BLD-MCNC: 11.8 G/DL (ref 13–17.7)
HYPOCHROMIA BLD QL: NORMAL
IRON 24H UR-MRATE: 85 MCG/DL (ref 59–158)
IRON SATN MFR SERPL: 19 % (ref 20–50)
LARGE PLATELETS: NORMAL
LDLC SERPL CALC-MCNC: 105 MG/DL
LDLC/HDLC SERPL: 3.09 {RATIO} (ref 0–3.55)
LYMPHOCYTES # BLD MANUAL: 2.83 10*3/MM3 (ref 0.7–3.1)
LYMPHOCYTES NFR BLD MANUAL: 7 % (ref 5–12)
MCH RBC QN AUTO: 22 PG (ref 26.6–33)
MCHC RBC AUTO-ENTMCNC: 32.3 G/DL (ref 31.5–35.7)
MCV RBC AUTO: 68 FL (ref 79–97)
MICROALBUMIN/CREAT UR: 48 MG/G
MICROCYTES BLD QL: NORMAL
MONOCYTES # BLD: 0.5 10*3/MM3 (ref 0.1–0.9)
NEUTROPHILS # BLD AUTO: 3.61 10*3/MM3 (ref 1.7–7)
NEUTROPHILS NFR BLD MANUAL: 51 % (ref 42.7–76)
PLATELET # BLD AUTO: 121 10*3/MM3 (ref 140–450)
PMV BLD AUTO: 0 FL (ref 6–12)
POTASSIUM SERPL-SCNC: 4 MMOL/L (ref 3.4–5)
PROT SERPL-MCNC: 8.1 G/DL (ref 6.3–8.6)
RBC # BLD AUTO: 5.37 10*6/MM3 (ref 4.14–5.8)
SCAN SLIDE: NORMAL
SMALL PLATELETS BLD QL SMEAR: NORMAL
SODIUM SERPL-SCNC: 144 MMOL/L (ref 137–145)
TARGETS BLD QL SMEAR: NORMAL
TIBC SERPL-MCNC: 457 MCG/DL (ref 298–536)
TRANSFERRIN SERPL-MCNC: 307 MG/DL (ref 200–360)
TRIGL SERPL-MCNC: 140 MG/DL
VARIANT LYMPHS NFR BLD MANUAL: 40 % (ref 19.6–45.3)
VIT B12 BLD-MCNC: 368 PG/ML (ref 211–946)
VLDLC SERPL-MCNC: 25 MG/DL (ref 5–40)
WBC MORPH BLD: NORMAL
WBC NRBC COR # BLD: 7.08 10*3/MM3 (ref 3.4–10.8)

## 2023-03-16 PROCEDURE — 83540 ASSAY OF IRON: CPT | Performed by: INTERNAL MEDICINE

## 2023-03-16 PROCEDURE — 80061 LIPID PANEL: CPT | Performed by: INTERNAL MEDICINE

## 2023-03-16 PROCEDURE — 83036 HEMOGLOBIN GLYCOSYLATED A1C: CPT | Performed by: INTERNAL MEDICINE

## 2023-03-16 PROCEDURE — 85025 COMPLETE CBC W/AUTO DIFF WBC: CPT | Performed by: INTERNAL MEDICINE

## 2023-03-16 PROCEDURE — 82043 UR ALBUMIN QUANTITATIVE: CPT | Performed by: INTERNAL MEDICINE

## 2023-03-16 PROCEDURE — 82570 ASSAY OF URINE CREATININE: CPT | Performed by: INTERNAL MEDICINE

## 2023-03-16 PROCEDURE — 80053 COMPREHEN METABOLIC PANEL: CPT | Performed by: INTERNAL MEDICINE

## 2023-03-16 PROCEDURE — 84466 ASSAY OF TRANSFERRIN: CPT | Performed by: INTERNAL MEDICINE

## 2023-03-16 PROCEDURE — 82607 VITAMIN B-12: CPT | Performed by: INTERNAL MEDICINE

## 2023-03-16 PROCEDURE — 36415 COLL VENOUS BLD VENIPUNCTURE: CPT | Performed by: INTERNAL MEDICINE

## 2023-03-31 ENCOUNTER — OFFICE VISIT (OUTPATIENT)
Dept: FAMILY MEDICINE CLINIC | Facility: CLINIC | Age: 68
End: 2023-03-31
Payer: MEDICARE

## 2023-03-31 VITALS
DIASTOLIC BLOOD PRESSURE: 94 MMHG | HEART RATE: 81 BPM | BODY MASS INDEX: 33.54 KG/M2 | HEIGHT: 71 IN | SYSTOLIC BLOOD PRESSURE: 160 MMHG | TEMPERATURE: 96 F | OXYGEN SATURATION: 98 % | WEIGHT: 239.6 LBS

## 2023-03-31 DIAGNOSIS — E78.5 HYPERLIPIDEMIA ASSOCIATED WITH TYPE 2 DIABETES MELLITUS: Chronic | ICD-10-CM

## 2023-03-31 DIAGNOSIS — E55.9 VITAMIN D DEFICIENCY: ICD-10-CM

## 2023-03-31 DIAGNOSIS — Z12.5 SCREENING FOR PROSTATE CANCER: ICD-10-CM

## 2023-03-31 DIAGNOSIS — R80.9 TYPE 2 DIABETES MELLITUS WITH MICROALBUMINURIA, WITHOUT LONG-TERM CURRENT USE OF INSULIN: Primary | ICD-10-CM

## 2023-03-31 DIAGNOSIS — E78.2 MIXED HYPERLIPIDEMIA: Chronic | ICD-10-CM

## 2023-03-31 DIAGNOSIS — I10 ESSENTIAL HYPERTENSION: Chronic | ICD-10-CM

## 2023-03-31 DIAGNOSIS — E11.29 TYPE 2 DIABETES MELLITUS WITH MICROALBUMINURIA, WITHOUT LONG-TERM CURRENT USE OF INSULIN: Primary | ICD-10-CM

## 2023-03-31 DIAGNOSIS — E11.69 HYPERLIPIDEMIA ASSOCIATED WITH TYPE 2 DIABETES MELLITUS: Chronic | ICD-10-CM

## 2023-03-31 DIAGNOSIS — E66.09 CLASS 1 OBESITY DUE TO EXCESS CALORIES WITH SERIOUS COMORBIDITY AND BODY MASS INDEX (BMI) OF 33.0 TO 33.9 IN ADULT: Chronic | ICD-10-CM

## 2023-03-31 DIAGNOSIS — D56.3 ALPHA THALASSAEMIA MINOR: ICD-10-CM

## 2023-03-31 DIAGNOSIS — D50.8 IRON DEFICIENCY ANEMIA SECONDARY TO INADEQUATE DIETARY IRON INTAKE: ICD-10-CM

## 2023-03-31 RX ORDER — SEMAGLUTIDE 1.34 MG/ML
0.5 INJECTION, SOLUTION SUBCUTANEOUS WEEKLY
Qty: 1.5 ML | Refills: 6 | Status: SHIPPED | OUTPATIENT
Start: 2023-03-31

## 2023-03-31 RX ORDER — PRAVASTATIN SODIUM 80 MG/1
80 TABLET ORAL DAILY
Qty: 90 TABLET | Refills: 3 | Status: SHIPPED | OUTPATIENT
Start: 2023-03-31

## 2023-03-31 RX ORDER — CARVEDILOL 6.25 MG/1
6.25 TABLET ORAL 2 TIMES DAILY WITH MEALS
Qty: 180 TABLET | Refills: 3 | Status: SHIPPED | OUTPATIENT
Start: 2023-03-31

## 2023-03-31 NOTE — PROGRESS NOTES
Chief Complaint  Essential hypertension; Hyperlipidemia associated with type 2 diabetes mellitus (HC; Chronic venous insufficiency of lower extremity; Thrombocytopenia (HCC); Type 2 diabetes mellitus without complication, without long; Class 1 obesity due to excess calories with serious comorbi; Primary osteoarthritis involving multiple joints; and Establish Care    Subjective        History of Present Illness     Tao Cuadra established care in our practice last fall and comes in today for follow up on chronic medical issues including Type 2 diabetes, essential hypertension, hyperlipidemia, iron deficiency anemia, osteoarthritis, and chronic thrombocytopenia.  Patient had some upper respiratory symptoms last week.  COVID was negative.     We referred patient to Dr. Murillo for iron deficiency anemia and thrombocytopenia.  Patient has underlying alpha thalassaemia minor.   Additional confirmatory testing was offered, however, discussed this would not .  He continues daily oral iron and monitoring.     BP above goal today at 160/94  as well as with visit with Dr. Caraballo last fall.   He is currently taking Norvasc 5 mg daily and lisinopril HCTZ 20/25 mg daily.  He has a BP cuff at home, but has not been monitoring.    Weight is up 8 pounds over winter months.  He tries to walk for exercise on days weather permits.  He admits he has had a weakness for concentrated sweets and has been eating Girl  cookies frequently.  With the weight gain and eating more sweets, his A1c has progressed from 6.9 to 7.3 despite taking metformin 1000 mg b.i.d.  We discussed adding a weekly GLP-1 agonist injection to help improve diabetes management and also provide some appetite suppression to help achieve weight loss.  He is in agreement.  Ozempic is on his formulary.          Lab results are reviewed with the patient today.  With weight gain, LDL trending up at 105. His A1c also up at 7.3.   Hopefully, the weekly  "Ozempic will help patient achieve weight loss and reduce his carbohydrate intake, which will help lower cholesterol.   Normal renal and liver function.            Objective   Vital Signs:  /94 (BP Location: Left arm, Patient Position: Sitting, Cuff Size: Large Adult)   Pulse 81   Temp 96 °F (35.6 °C) (Tympanic)   Ht 180.3 cm (70.98\")   Wt 109 kg (239 lb 9.6 oz)   SpO2 98%   BMI 33.43 kg/m²   Estimated body mass index is 33.43 kg/m² as calculated from the following:    Height as of this encounter: 180.3 cm (70.98\").    Weight as of this encounter: 109 kg (239 lb 9.6 oz).             Physical Exam  Vitals reviewed.   Constitutional:       General: He is not in acute distress.     Appearance: He is well-developed.   HENT:      Head: Normocephalic and atraumatic.      Nose:      Right Sinus: No maxillary sinus tenderness or frontal sinus tenderness.      Left Sinus: No maxillary sinus tenderness or frontal sinus tenderness.      Mouth/Throat:      Mouth: No oral lesions.      Pharynx: Uvula midline.      Tonsils: No tonsillar exudate.   Eyes:      Conjunctiva/sclera: Conjunctivae normal.      Pupils: Pupils are equal, round, and reactive to light.   Neck:      Thyroid: No thyroid mass or thyromegaly.      Vascular: No carotid bruit or JVD.      Trachea: Trachea normal. No tracheal deviation.   Cardiovascular:      Rate and Rhythm: Normal rate and regular rhythm.  No extrasystoles are present.     Chest Wall: PMI is not displaced.      Heart sounds: Normal heart sounds. No murmur heard.  Pulmonary:      Effort: Pulmonary effort is normal. No accessory muscle usage or respiratory distress.      Breath sounds: Normal breath sounds. No decreased breath sounds, wheezing, rhonchi or rales.   Abdominal:      General: Bowel sounds are normal. There is no distension.      Palpations: Abdomen is soft.      Tenderness: There is no abdominal tenderness.   Musculoskeletal:      Cervical back: Neck supple. "   Lymphadenopathy:      Cervical: No cervical adenopathy.   Skin:     General: Skin is warm and dry.      Findings: No rash.      Nails: There is no clubbing.   Neurological:      Mental Status: He is alert and oriented to person, place, and time.      Cranial Nerves: No cranial nerve deficit.      Coordination: Coordination normal.   Psychiatric:         Speech: Speech normal.         Behavior: Behavior normal.         Thought Content: Thought content normal.         Judgment: Judgment normal.            Result Review :    CMP    CMP 9/20/22 3/16/23   Glucose 123 (A) 120 (A)   BUN 17 15   Creatinine 1.13 0.91   eGFR 70.8 91.8   Sodium 140 144   Potassium 3.6 4.0   Chloride 105 108   Calcium 9.1 9.1   Total Protein 7.6 8.1   Albumin 4.50 4.6   Globulin 3.1 3.5   Total Bilirubin 0.9 0.8   Alkaline Phosphatase 78 73   AST (SGOT) 33 36   ALT (SGPT) 39 44   Albumin/Globulin Ratio 1.5 1.3   BUN/Creatinine Ratio 15.0 16.5   Anion Gap 9.0 10.0   (A) Abnormal value       Comments are available for some flowsheets but are not being displayed.           CBC w/diff    CBC w/Diff 9/20/22 3/16/23   WBC 7.39 7.08   RBC 5.59 5.37   Hemoglobin 11.7 (A) 11.8 (A)   Hematocrit 36.6 (A) 36.5 (A)   MCV 65.5 (A) 68.0 (A)   MCH 20.9 (A) 22.0 (A)   MCHC 32.0 32.3   RDW 17.2 (A) 17.1 (A)   Platelets 129 (A) 121 (A)   (A) Abnormal value            Lipid Panel    Lipid Panel 9/20/22 3/16/23   Total Cholesterol  163   Triglycerides  140   HDL Cholesterol  33 (A)   VLDL Cholesterol  25   LDL Cholesterol  87 105 (A)   LDL/HDL Ratio  3.09   (A) Abnormal value            TSH    TSH 9/20/22   TSH 1.580           A1C Last 3 Results    HGBA1C Last 3 Results 9/20/22 3/16/23   Hemoglobin A1C 6.90 (A) 7.30 (A)   (A) Abnormal value            PSA    PSA 9/20/22   PSA 1.390           Data reviewed: Consultant notes Dr. Murillo/hematology.              Assessment and Plan   Diagnoses and all orders for this visit:    1. Type 2 diabetes mellitus with  microalbuminuria, without long-term current use of insulin (Primary)  -     CBC Auto Differential; Future  -     Comprehensive Metabolic Panel; Future  -     Ferritin; Future  -     Iron Profile; Future  -     Hemoglobin A1c; Future  -     LDL Cholesterol, Direct; Future  -     TSH; Future  -     Vitamin B12; Future    2. Mixed hyperlipidemia  -     pravastatin (PRAVACHOL) 80 MG tablet; Take 1 tablet by mouth Daily.  Dispense: 90 tablet; Refill: 3  -     LDL Cholesterol, Direct; Future    3. Hyperlipidemia associated with type 2 diabetes mellitus  -     LDL Cholesterol, Direct; Future    4. Essential hypertension  -     Comprehensive Metabolic Panel; Future    5. Iron deficiency anemia secondary to inadequate dietary iron intake  -     CBC Auto Differential; Future  -     Ferritin; Future  -     Iron Profile; Future    6. Alpha thalassaemia minor  -     CBC Auto Differential; Future    7. Screening for prostate cancer  -     PSA Screen; Future    8. Class 1 obesity due to excess calories with serious comorbidity and body mass index (BMI) of 33.0 to 33.9 in adult  -     Comprehensive Metabolic Panel; Future    9. Vitamin D deficiency  -     Vitamin D,25-Hydroxy; Future    Other orders  -     Semaglutide,0.25 or 0.5MG/DOS, (Ozempic, 0.25 or 0.5 MG/DOSE,) 2 MG/1.5ML solution pen-injector; Inject 0.5 mg under the skin into the appropriate area as directed 1 (One) Time Per Week.  Dispense: 1.5 mL; Refill: 6  -     carvedilol (Coreg) 6.25 MG tablet; Take 1 tablet by mouth 2 (Two) Times a Day With Meals.  Dispense: 180 tablet; Refill: 3  -     Cancel: Hepatitis B Vaccine Pediatric / Adolescent 3-dose IM                His type 2 diabetes is not at goal.  To help manage diabetes and give appetite suppression to help achieve weight loss, a prescription is sent for GLP-1, weekly Ozempic 0.25 mg x 2 weeks, then progressing to 0.5 mg weekly.   Continue metformin 1000 mg b.i.d.  We reviewed diabetic diet, exercise, weight loss  goals/principles and encouraged him to take advantage of the appetite suppression to help with weight loss.  If he has any difficulty with the injections, he can drop by the office next week for demonstration.  Resume walking for exercise trying to get in at least 5 days weekly.      Blood pressure is not at goal.  To help lower BP, a prescription sent for Coreg 6.25 mg to take one tablet b.i.d.  Continue the current doses of Norvasc and lisinopril HCT.  He agrees to monitor BP and notify us if not consistently at goal.      Continue daily oral iron and follow up visits with Dr. Murillo for anemia with  underlying alpha thalassaemia minor.     LDL cholesterol is not at goal.  Continue high-dose pravastatin 80 mg daily.  Continue diet and intensify dietary efforts. LDL above goal at 105, but should improve with intensified diet and exercise and weekly Ozempic if he can achieve some weight loss.  We will switch to them higher potency statin in the future if he does not get back to goal    He has a history of vitamin D deficiency.  I will recheck a vitamin D level with next labs.    Return in 6 months for routine follow up with fasting labs one week prior or sooner if needed.  We will schedule subsequent Medicare wellness with his next fall up.      Scribed for Dr. Jarvis by Katia Valencia [facility].     Follow Up   Return in about 6 months (around 9/30/2023) for Follow up in six months with labs one week prior..  Patient was given instructions and counseling regarding his condition or for health maintenance advice. Please see specific information pulled into the AVS if appropriate.

## 2023-06-15 DIAGNOSIS — R80.9 TYPE 2 DIABETES MELLITUS WITH MICROALBUMINURIA, WITHOUT LONG-TERM CURRENT USE OF INSULIN: Primary | ICD-10-CM

## 2023-06-15 DIAGNOSIS — E11.29 TYPE 2 DIABETES MELLITUS WITH MICROALBUMINURIA, WITHOUT LONG-TERM CURRENT USE OF INSULIN: Primary | ICD-10-CM

## 2023-07-27 ENCOUNTER — TELEPHONE (OUTPATIENT)
Dept: ONCOLOGY | Facility: CLINIC | Age: 68
End: 2023-07-27
Payer: MEDICARE

## 2023-07-27 NOTE — TELEPHONE ENCOUNTER
TABITHA-  Pt received letter that he missed appointment back in April. He does not want to reschedule and this time.     Will contact our office if he feels like he needs to come back

## 2023-08-07 RX ORDER — GLUCOSAM/CHON-MSM1/C/MANG/BOSW 500-416.6
TABLET ORAL
Qty: 100 EACH | Refills: 3 | Status: SHIPPED | OUTPATIENT
Start: 2023-08-07

## 2023-09-13 DIAGNOSIS — I10 ESSENTIAL HYPERTENSION: Chronic | ICD-10-CM

## 2023-09-14 RX ORDER — AMLODIPINE BESYLATE 5 MG/1
5 TABLET ORAL DAILY
Qty: 90 TABLET | Refills: 1 | Status: SHIPPED | OUTPATIENT
Start: 2023-09-14

## 2023-09-14 RX ORDER — LISINOPRIL AND HYDROCHLOROTHIAZIDE 25; 20 MG/1; MG/1
1 TABLET ORAL DAILY
Qty: 90 TABLET | Refills: 1 | Status: SHIPPED | OUTPATIENT
Start: 2023-09-14